# Patient Record
Sex: FEMALE | Race: BLACK OR AFRICAN AMERICAN | NOT HISPANIC OR LATINO | Employment: FULL TIME | URBAN - METROPOLITAN AREA
[De-identification: names, ages, dates, MRNs, and addresses within clinical notes are randomized per-mention and may not be internally consistent; named-entity substitution may affect disease eponyms.]

---

## 2023-03-09 DIAGNOSIS — R42 DIZZINESS: ICD-10-CM

## 2023-03-09 DIAGNOSIS — R42 DIZZINESS: Primary | ICD-10-CM

## 2023-03-09 PROBLEM — M25.562 KNEE PAIN, BILATERAL: Status: ACTIVE | Noted: 2023-03-09

## 2023-03-09 PROBLEM — I10 BENIGN ESSENTIAL HYPERTENSION: Status: ACTIVE | Noted: 2023-03-09

## 2023-03-09 PROBLEM — H52.10 MYOPIA WITH PRESBYOPIA: Status: ACTIVE | Noted: 2023-03-09

## 2023-03-09 PROBLEM — M17.0 PRIMARY OSTEOARTHRITIS OF BOTH KNEES: Status: ACTIVE | Noted: 2023-03-09

## 2023-03-09 PROBLEM — K21.9 GERD WITHOUT ESOPHAGITIS: Status: ACTIVE | Noted: 2023-03-09

## 2023-03-09 PROBLEM — R91.1 PULMONARY NODULE: Status: ACTIVE | Noted: 2023-03-09

## 2023-03-09 PROBLEM — I87.8 CHRONIC VENOUS STASIS: Status: ACTIVE | Noted: 2023-03-09

## 2023-03-09 PROBLEM — M17.12 ARTHRITIS OF LEFT KNEE: Status: ACTIVE | Noted: 2023-03-09

## 2023-03-09 PROBLEM — M48.062 LUMBAR STENOSIS WITH NEUROGENIC CLAUDICATION: Status: ACTIVE | Noted: 2023-03-09

## 2023-03-09 PROBLEM — E78.5 DYSLIPIDEMIA: Status: ACTIVE | Noted: 2023-03-09

## 2023-03-09 PROBLEM — H25.813 COMBINED FORM OF AGE-RELATED CATARACT, BOTH EYES: Status: ACTIVE | Noted: 2023-03-09

## 2023-03-09 PROBLEM — R60.0 LEG EDEMA: Status: ACTIVE | Noted: 2023-03-09

## 2023-03-09 PROBLEM — G47.00 INSOMNIA: Status: ACTIVE | Noted: 2023-03-09

## 2023-03-09 PROBLEM — H40.003 GLAUCOMA SUSPECT OF BOTH EYES: Status: ACTIVE | Noted: 2023-03-09

## 2023-03-09 PROBLEM — G47.30 SLEEP APNEA, UNSPECIFIED: Status: ACTIVE | Noted: 2023-03-09

## 2023-03-09 PROBLEM — E66.813 OBESITY, CLASS III, BMI 40-49.9 (MORBID OBESITY): Status: ACTIVE | Noted: 2023-03-09

## 2023-03-09 PROBLEM — M25.561 KNEE PAIN, BILATERAL: Status: ACTIVE | Noted: 2023-03-09

## 2023-03-09 PROBLEM — E11.9 DIABETES MELLITUS, TYPE 2 (MULTI): Status: ACTIVE | Noted: 2023-03-09

## 2023-03-09 PROBLEM — G47.33 OBSTRUCTIVE SLEEP APNEA, ADULT: Status: ACTIVE | Noted: 2023-03-09

## 2023-03-09 PROBLEM — H52.4 MYOPIA WITH PRESBYOPIA: Status: ACTIVE | Noted: 2023-03-09

## 2023-03-09 PROBLEM — E55.9 MILD VITAMIN D DEFICIENCY: Status: ACTIVE | Noted: 2023-03-09

## 2023-03-09 PROBLEM — M54.50 LOW BACK PAIN: Status: ACTIVE | Noted: 2023-03-09

## 2023-03-09 PROBLEM — E66.01 OBESITY, CLASS III, BMI 40-49.9 (MORBID OBESITY) (MULTI): Status: ACTIVE | Noted: 2023-03-09

## 2023-03-09 PROBLEM — M43.10 ACQUIRED SPONDYLOLISTHESIS: Status: ACTIVE | Noted: 2023-03-09

## 2023-03-09 RX ORDER — SIMVASTATIN 20 MG/1
1 TABLET, FILM COATED ORAL NIGHTLY
COMMUNITY
Start: 2022-03-31 | End: 2023-07-12 | Stop reason: ALTCHOICE

## 2023-03-09 RX ORDER — ACETAMINOPHEN 500 MG/1
CAPSULE, LIQUID FILLED ORAL
COMMUNITY

## 2023-03-09 RX ORDER — MECLIZINE HCL 12.5 MG 12.5 MG/1
1 TABLET ORAL 3 TIMES DAILY PRN
COMMUNITY
Start: 2021-04-29 | End: 2023-03-09 | Stop reason: SDUPTHER

## 2023-03-09 RX ORDER — TRIAMTERENE/HYDROCHLOROTHIAZID 37.5-25 MG
1 TABLET ORAL DAILY
COMMUNITY
Start: 2019-11-20 | End: 2023-05-01

## 2023-03-09 RX ORDER — MECLIZINE HCL 12.5 MG 12.5 MG/1
12.5 TABLET ORAL 3 TIMES DAILY PRN
Qty: 30 TABLET | Refills: 3 | Status: SHIPPED | OUTPATIENT
Start: 2023-03-09 | End: 2023-03-09 | Stop reason: SDUPTHER

## 2023-03-09 RX ORDER — MECLIZINE HCL 12.5 MG 12.5 MG/1
12.5 TABLET ORAL 3 TIMES DAILY PRN
Qty: 30 TABLET | Refills: 3 | Status: SHIPPED | OUTPATIENT
Start: 2023-03-09

## 2023-03-09 RX ORDER — FLUTICASONE PROPIONATE 50 MCG
2 SPRAY, SUSPENSION (ML) NASAL DAILY
COMMUNITY
Start: 2022-12-06

## 2023-03-16 RX ORDER — FLUTICASONE PROPIONATE 93 UG/1
SPRAY, METERED NASAL
COMMUNITY
Start: 2023-03-02 | End: 2023-07-12 | Stop reason: SDUPTHER

## 2023-03-16 RX ORDER — NEBULIZER AND COMPRESSOR
EACH MISCELLANEOUS
COMMUNITY
Start: 2023-01-20

## 2023-04-28 DIAGNOSIS — I10 BENIGN ESSENTIAL HYPERTENSION: Primary | ICD-10-CM

## 2023-05-01 RX ORDER — TRIAMTERENE/HYDROCHLOROTHIAZID 37.5-25 MG
TABLET ORAL
Qty: 90 TABLET | Refills: 3 | Status: SHIPPED | OUTPATIENT
Start: 2023-05-01 | End: 2023-09-19

## 2023-05-17 DIAGNOSIS — M17.12 ARTHRITIS OF LEFT KNEE: Primary | ICD-10-CM

## 2023-05-17 DIAGNOSIS — M48.062 LUMBAR STENOSIS WITH NEUROGENIC CLAUDICATION: ICD-10-CM

## 2023-05-17 RX ORDER — DICLOFENAC SODIUM 10 MG/G
4 GEL TOPICAL 4 TIMES DAILY
Qty: 100 G | Refills: 3 | Status: SHIPPED | OUTPATIENT
Start: 2023-05-17 | End: 2023-08-28

## 2023-05-18 DIAGNOSIS — M17.12 ARTHRITIS OF LEFT KNEE: Primary | ICD-10-CM

## 2023-05-18 RX ORDER — AMOXICILLIN AND CLAVULANATE POTASSIUM 875; 125 MG/1; MG/1
1 TABLET, FILM COATED ORAL EVERY 12 HOURS
COMMUNITY
Start: 2023-01-03 | End: 2023-07-12 | Stop reason: ALTCHOICE

## 2023-05-18 RX ORDER — FLUTICASONE PROPIONATE 93 UG/1
1 SPRAY, METERED NASAL 2 TIMES DAILY
COMMUNITY
Start: 2023-01-18 | End: 2024-02-27 | Stop reason: ALTCHOICE

## 2023-05-18 RX ORDER — ACETAMINOPHEN 500 MG
TABLET ORAL
Qty: 90 TABLET | Refills: 3 | Status: SHIPPED | OUTPATIENT
Start: 2023-05-18

## 2023-05-18 RX ORDER — SEMAGLUTIDE 1.34 MG/ML
INJECTION, SOLUTION SUBCUTANEOUS
COMMUNITY
Start: 2023-01-13 | End: 2023-07-12

## 2023-07-12 ENCOUNTER — TELEMEDICINE (OUTPATIENT)
Dept: PRIMARY CARE | Facility: CLINIC | Age: 63
End: 2023-07-12
Payer: COMMERCIAL

## 2023-07-12 DIAGNOSIS — M25.471 ANKLE EDEMA, BILATERAL: Primary | ICD-10-CM

## 2023-07-12 DIAGNOSIS — M25.472 ANKLE EDEMA, BILATERAL: Primary | ICD-10-CM

## 2023-07-12 DIAGNOSIS — I10 BENIGN ESSENTIAL HYPERTENSION: ICD-10-CM

## 2023-07-12 DIAGNOSIS — E11.9 TYPE 2 DIABETES MELLITUS WITHOUT COMPLICATION, WITHOUT LONG-TERM CURRENT USE OF INSULIN (MULTI): ICD-10-CM

## 2023-07-12 PROBLEM — Q66.6 CALCANEOVALGUS DEFORMITY OF BOTH FEET: Status: ACTIVE | Noted: 2017-05-12

## 2023-07-12 PROBLEM — M21.6X2 PRONATION OF BOTH FEET: Status: ACTIVE | Noted: 2023-07-12

## 2023-07-12 PROBLEM — E04.2 MULTIPLE THYROID NODULES: Status: ACTIVE | Noted: 2018-01-29

## 2023-07-12 PROBLEM — J34.2 NASAL SEPTAL DEVIATION: Status: ACTIVE | Noted: 2023-07-12

## 2023-07-12 PROBLEM — M21.6X1 PRONATION OF BOTH FEET: Status: ACTIVE | Noted: 2023-07-12

## 2023-07-12 PROBLEM — H04.123 DRY EYES, BILATERAL: Status: ACTIVE | Noted: 2023-07-12

## 2023-07-12 PROBLEM — M62.838 MUSCLE SPASM: Status: ACTIVE | Noted: 2023-07-12

## 2023-07-12 PROCEDURE — 99214 OFFICE O/P EST MOD 30 MIN: CPT | Performed by: PHYSICIAN ASSISTANT

## 2023-07-12 RX ORDER — LANCETS
EACH MISCELLANEOUS
Qty: 100 EACH | Refills: 3 | Status: SHIPPED | OUTPATIENT
Start: 2023-07-12

## 2023-07-12 RX ORDER — DEXTROSE 4 G
TABLET,CHEWABLE ORAL
Qty: 1 EACH | Refills: 0 | Status: SHIPPED | OUTPATIENT
Start: 2023-07-12 | End: 2024-07-11

## 2023-07-12 RX ORDER — ATORVASTATIN CALCIUM 40 MG/1
1 TABLET, FILM COATED ORAL NIGHTLY
COMMUNITY
Start: 2020-05-20

## 2023-07-12 RX ORDER — METFORMIN HYDROCHLORIDE 500 MG/1
500 TABLET, EXTENDED RELEASE ORAL
COMMUNITY
Start: 2019-11-20 | End: 2023-07-12 | Stop reason: SINTOL

## 2023-07-12 ASSESSMENT — ENCOUNTER SYMPTOMS
NUMBNESS: 0
MYALGIAS: 0
ARTHRALGIAS: 0
NAUSEA: 0
EYE DISCHARGE: 0
FACIAL SWELLING: 0
SORE THROAT: 0
JOINT SWELLING: 0
NERVOUS/ANXIOUS: 0
DIARRHEA: 0
ANAL BLEEDING: 0
POLYDIPSIA: 0
PALPITATIONS: 0
EYE PAIN: 0
FREQUENCY: 0
DIZZINESS: 0
FATIGUE: 0
TREMORS: 0
CONFUSION: 0
POLYPHAGIA: 0
COLOR CHANGE: 0
WHEEZING: 0
HEMATURIA: 0
CHEST TIGHTNESS: 0
CONSTIPATION: 0
ABDOMINAL PAIN: 0
CHILLS: 0
COUGH: 0
ABDOMINAL DISTENTION: 0
SLEEP DISTURBANCE: 0
FEVER: 0
HEADACHES: 0
APPETITE CHANGE: 0
DIFFICULTY URINATING: 0
SHORTNESS OF BREATH: 0
VOMITING: 0
WEAKNESS: 0
CHOKING: 0

## 2023-07-12 NOTE — PROGRESS NOTES
Subjective   Patient ID: Felecia Alejo is a 63 y.o. female with a history of hypertension, diabetes type 2, dyslipidemia, obesity, GERD, chronic low back pain, chronic pain of both knees, s/p hysterectomy who presents for Joint Swelling.    Virtual or Telephone Consent    An interactive audio and video telecommunication system which permits real time communications between the patient (at the originating site) and provider (at the distant site) was utilized to provide this telehealth service.   Verbal consent was requested and obtained from Felecia Alejo on this date, 07/12/23 for a telehealth visit.       HPI The patient is complaining of ankle edema which she has has been having for a few months.  Stated it gets worse as the day goes on and better with elevation and compression stockings.  Per patient, she drinks 72 oz of water a day and 3 cans of lemonade or soda.  Also she likes salty seasoning on her food.  She recently started walking more and does not seat as much as before.  She denies shortness of breath, chest pain or heart palpitations.  No wheezing.    Review of Systems   Constitutional:  Negative for appetite change, chills, fatigue and fever.   HENT:  Negative for congestion, ear pain, facial swelling, hearing loss, nosebleeds and sore throat.    Eyes:  Negative for pain, discharge and visual disturbance.   Respiratory:  Negative for cough, choking, chest tightness, shortness of breath and wheezing.    Cardiovascular:  Negative for chest pain, palpitations and leg swelling.        Ankle edema bilaterally   Gastrointestinal:  Negative for abdominal distention, abdominal pain, anal bleeding, constipation, diarrhea, nausea and vomiting.   Endocrine: Negative for cold intolerance, heat intolerance, polydipsia, polyphagia and polyuria.   Genitourinary:  Negative for difficulty urinating, frequency, hematuria and urgency.   Musculoskeletal:  Negative for arthralgias, gait problem, joint swelling and  myalgias.   Skin:  Negative for color change and rash.   Neurological:  Negative for dizziness, tremors, syncope, weakness, numbness and headaches.   Psychiatric/Behavioral:  Negative for behavioral problems, confusion, sleep disturbance and suicidal ideas. The patient is not nervous/anxious.        Objective   There were no vitals taken for this visit.    Physical Exam    Assessment/Plan     Ankle edema bilateral  The patient drinks approximately 110 ounces of liquid daily and eats salty food  She was instructed to cut down her fluids to no more than 64 ounces a day and cut down on salt  Substitute salty snacks with vegetables  Continue to wear compression stockings  Continue to elevate feet  Continue triamterene-HCTZ 37.5-25 mg daily  The patient was advised to come back to the office in a month for blood work including albumin and CBC  Referral for vascular surgery was given to the patient last visit, not done     High blood pressure   Blood pressure well controlled  No added salt diet, do not add salt to your food  Try to exercise every other day for 30 minutes  Continue triamterene-HCTZ 37.5-25 mg daily     Chronic pain of both knees  Knee replacement is recommended  not a good candidate since BMI is over 42  declined bariatric surgery  Had steroidal injection with much improvement  Able to exercise now  Do not take any NSAIDs such as Aleve, ibuprofen, Motrin or naproxen  Take Tylenol extra strength as needed for pain twice daily  Apply heating pads and topical cream  Follow-up with orthopedic surgery Dr. King      Low back pain  stable  Core exercises  Do not take NSAIDs  Tylenol as needed for pain  Apply heating pads  Apply topical cream such as Biofreeze or Voltaren     Muscle spasms  Improved  Off of magnesium     Diabetes type II  Ozempic was too expensive  The patient is currently not on any medications  She does not monitor her blood glucose at home  I will send a prescription for glucometer and I  instructed her to monitor blood glucose at least once a day before breakfast  Avoid soda and lemonade due to high sugar  Low carbohydrate diet, exercise daily  Follow up with ophthalmology as scheduled    Morbid obesity  Elevated BMI, ideal BMI is between 23-26 kg/m2  Weight loss recommended  Ozempic was too expensive  Low fat, low cholesterol diet, low carbohydrate diet  Exercise as much as you can     Health maintenance  Flu vaccine declined  COVID-vaccine up-to-date  Tdap 2018  Mammogram March 2022 negative  Colonoscopy, scheduled in 3 weeks     Come back in 1 month for fasting blood work and checkup

## 2023-09-14 RX ORDER — ALBUTEROL SULFATE 90 UG/1
AEROSOL, METERED RESPIRATORY (INHALATION)
COMMUNITY
Start: 2016-02-09

## 2023-09-14 RX ORDER — AMLODIPINE BESYLATE 5 MG/1
1 TABLET ORAL DAILY
COMMUNITY
Start: 2015-09-14 | End: 2023-09-19 | Stop reason: SINTOL

## 2023-09-14 RX ORDER — ERGOCALCIFEROL 1.25 MG/1
1 CAPSULE ORAL WEEKLY
COMMUNITY
Start: 2015-07-20

## 2023-09-14 RX ORDER — ATENOLOL 100 MG/1
1 TABLET ORAL DAILY
COMMUNITY
Start: 2015-09-10 | End: 2023-09-19 | Stop reason: ALTCHOICE

## 2023-09-14 RX ORDER — LANCETS 33 GAUGE
EACH MISCELLANEOUS
COMMUNITY
Start: 2023-07-12

## 2023-09-14 RX ORDER — OXAPROZIN 600 MG/1
TABLET, FILM COATED ORAL
COMMUNITY
Start: 2016-03-29 | End: 2024-02-27 | Stop reason: ALTCHOICE

## 2023-09-19 ENCOUNTER — OFFICE VISIT (OUTPATIENT)
Dept: PRIMARY CARE | Facility: CLINIC | Age: 63
End: 2023-09-19
Payer: COMMERCIAL

## 2023-09-19 VITALS
WEIGHT: 263 LBS | DIASTOLIC BLOOD PRESSURE: 82 MMHG | SYSTOLIC BLOOD PRESSURE: 126 MMHG | BODY MASS INDEX: 48.4 KG/M2 | TEMPERATURE: 96.8 F | HEIGHT: 62 IN

## 2023-09-19 DIAGNOSIS — R91.8 PULMONARY NODULES: ICD-10-CM

## 2023-09-19 DIAGNOSIS — R60.0 BILATERAL LEG EDEMA: Primary | ICD-10-CM

## 2023-09-19 DIAGNOSIS — E66.01 OBESITY, CLASS III, BMI 40-49.9 (MORBID OBESITY) (MULTI): ICD-10-CM

## 2023-09-19 DIAGNOSIS — Z12.31 ENCOUNTER FOR SCREENING MAMMOGRAM FOR MALIGNANT NEOPLASM OF BREAST: ICD-10-CM

## 2023-09-19 DIAGNOSIS — Z78.0 ASYMPTOMATIC MENOPAUSAL STATE: ICD-10-CM

## 2023-09-19 DIAGNOSIS — E53.8 VITAMIN B12 DEFICIENCY: ICD-10-CM

## 2023-09-19 DIAGNOSIS — E11.9 TYPE 2 DIABETES MELLITUS WITHOUT COMPLICATION, WITHOUT LONG-TERM CURRENT USE OF INSULIN (MULTI): ICD-10-CM

## 2023-09-19 DIAGNOSIS — E55.9 MILD VITAMIN D DEFICIENCY: ICD-10-CM

## 2023-09-19 DIAGNOSIS — R01.1 MURMUR, CARDIAC: ICD-10-CM

## 2023-09-19 DIAGNOSIS — E78.2 MIXED HYPERLIPIDEMIA: ICD-10-CM

## 2023-09-19 DIAGNOSIS — I10 BENIGN ESSENTIAL HYPERTENSION: ICD-10-CM

## 2023-09-19 DIAGNOSIS — E78.5 DYSLIPIDEMIA: ICD-10-CM

## 2023-09-19 LAB
APPEARANCE UR: CLEAR
BILIRUB UR QL STRIP: NEGATIVE
COLOR UR: YELLOW
GLUCOSE UR STRIP-MCNC: NEGATIVE MG/DL
HGB UR QL STRIP: NEGATIVE
KETONES UR STRIP-MCNC: NEGATIVE MG/DL
LEUKOCYTE ESTERASE UR QL STRIP: NEGATIVE
NITRITE UR QL STRIP: NEGATIVE
PH UR STRIP: 7.5 [PH]
PROT UR STRIP-MCNC: NEGATIVE MG/DL
SP GR UR STRIP.AUTO: 1.02
UROBILINOGEN UR STRIP-ACNC: 0.2 E.U./DL

## 2023-09-19 PROCEDURE — 83036 HEMOGLOBIN GLYCOSYLATED A1C: CPT | Performed by: PHYSICIAN ASSISTANT

## 2023-09-19 PROCEDURE — 3074F SYST BP LT 130 MM HG: CPT | Performed by: PHYSICIAN ASSISTANT

## 2023-09-19 PROCEDURE — 3079F DIAST BP 80-89 MM HG: CPT | Performed by: PHYSICIAN ASSISTANT

## 2023-09-19 PROCEDURE — 1036F TOBACCO NON-USER: CPT | Performed by: PHYSICIAN ASSISTANT

## 2023-09-19 PROCEDURE — 81003 URINALYSIS AUTO W/O SCOPE: CPT | Performed by: PHYSICIAN ASSISTANT

## 2023-09-19 PROCEDURE — 85025 COMPLETE CBC W/AUTO DIFF WBC: CPT | Performed by: PHYSICIAN ASSISTANT

## 2023-09-19 PROCEDURE — 82306 VITAMIN D 25 HYDROXY: CPT | Performed by: PHYSICIAN ASSISTANT

## 2023-09-19 PROCEDURE — 84443 ASSAY THYROID STIM HORMONE: CPT | Performed by: PHYSICIAN ASSISTANT

## 2023-09-19 PROCEDURE — 82607 VITAMIN B-12: CPT | Performed by: PHYSICIAN ASSISTANT

## 2023-09-19 PROCEDURE — 99214 OFFICE O/P EST MOD 30 MIN: CPT | Performed by: PHYSICIAN ASSISTANT

## 2023-09-19 PROCEDURE — 80053 COMPREHEN METABOLIC PANEL: CPT | Performed by: PHYSICIAN ASSISTANT

## 2023-09-19 RX ORDER — FUROSEMIDE 40 MG/1
40 TABLET ORAL DAILY
Qty: 30 TABLET | Refills: 3 | Status: SHIPPED | OUTPATIENT
Start: 2023-09-19 | End: 2023-12-26

## 2023-09-19 RX ORDER — TOPIRAMATE 25 MG/1
25 TABLET ORAL DAILY
Qty: 30 TABLET | Refills: 5 | Status: SHIPPED | OUTPATIENT
Start: 2023-09-19 | End: 2023-12-26

## 2023-09-19 ASSESSMENT — PATIENT HEALTH QUESTIONNAIRE - PHQ9
10. IF YOU CHECKED OFF ANY PROBLEMS, HOW DIFFICULT HAVE THESE PROBLEMS MADE IT FOR YOU TO DO YOUR WORK, TAKE CARE OF THINGS AT HOME, OR GET ALONG WITH OTHER PEOPLE: NOT DIFFICULT AT ALL
2. FEELING DOWN, DEPRESSED OR HOPELESS: NEARLY EVERY DAY
7. TROUBLE CONCENTRATING ON THINGS, SUCH AS READING THE NEWSPAPER OR WATCHING TELEVISION: NOT AT ALL
SUM OF ALL RESPONSES TO PHQ9 QUESTIONS 1 AND 2: 6
SUM OF ALL RESPONSES TO PHQ QUESTIONS 1-9: 11
6. FEELING BAD ABOUT YOURSELF - OR THAT YOU ARE A FAILURE OR HAVE LET YOURSELF OR YOUR FAMILY DOWN: NOT AT ALL
9. THOUGHTS THAT YOU WOULD BE BETTER OFF DEAD, OR OF HURTING YOURSELF: NOT AT ALL
8. MOVING OR SPEAKING SO SLOWLY THAT OTHER PEOPLE COULD HAVE NOTICED. OR THE OPPOSITE, BEING SO FIGETY OR RESTLESS THAT YOU HAVE BEEN MOVING AROUND A LOT MORE THAN USUAL: NOT AT ALL
3. TROUBLE FALLING OR STAYING ASLEEP OR SLEEPING TOO MUCH: SEVERAL DAYS
5. POOR APPETITE OR OVEREATING: NEARLY EVERY DAY
1. LITTLE INTEREST OR PLEASURE IN DOING THINGS: NEARLY EVERY DAY
4. FEELING TIRED OR HAVING LITTLE ENERGY: SEVERAL DAYS

## 2023-09-19 ASSESSMENT — ENCOUNTER SYMPTOMS
SLEEP DISTURBANCE: 0
HEMATURIA: 0
POLYDIPSIA: 0
TREMORS: 0
VOMITING: 0
POLYPHAGIA: 0
FATIGUE: 0
ABDOMINAL PAIN: 0
CONFUSION: 0
SORE THROAT: 0
EYE PAIN: 0
HEADACHES: 0
DIZZINESS: 0
CHEST TIGHTNESS: 0
WHEEZING: 0
SHORTNESS OF BREATH: 0
NERVOUS/ANXIOUS: 0
FREQUENCY: 0
CHILLS: 0
CONSTIPATION: 0
JOINT SWELLING: 0
COUGH: 0
ANAL BLEEDING: 0
LOSS OF SENSATION IN FEET: 0
CHOKING: 0
EYE DISCHARGE: 0
OCCASIONAL FEELINGS OF UNSTEADINESS: 1
MYALGIAS: 0
DEPRESSION: 1
DIARRHEA: 0
WEAKNESS: 0
APPETITE CHANGE: 0
NAUSEA: 0
PALPITATIONS: 0
DIFFICULTY URINATING: 0
FACIAL SWELLING: 0
ARTHRALGIAS: 0
NUMBNESS: 0
FEVER: 0
ABDOMINAL DISTENTION: 0
COLOR CHANGE: 0

## 2023-09-19 ASSESSMENT — COLUMBIA-SUICIDE SEVERITY RATING SCALE - C-SSRS
1. IN THE PAST MONTH, HAVE YOU WISHED YOU WERE DEAD OR WISHED YOU COULD GO TO SLEEP AND NOT WAKE UP?: NO
2. HAVE YOU ACTUALLY HAD ANY THOUGHTS OF KILLING YOURSELF?: NO
6. HAVE YOU EVER DONE ANYTHING, STARTED TO DO ANYTHING, OR PREPARED TO DO ANYTHING TO END YOUR LIFE?: NO

## 2023-09-19 NOTE — PROGRESS NOTES
Subjective   Patient ID: Felecia Alejo is a 63 y.o. female with a history of hypertension, leg edema, diabetes type 2, dyslipidemia, obesity, GERD, chronic low back pain, chronic pain of both knees, s/p hysterectomy who presents for Follow-up and Leg Swelling.    HPI The patient's leg edema is not improving.  She tries to elevate her feet and takes triamterene-HCTZ.  She cut down on fluids and salt; however, is not getting any better.  Her blood pressure runs below 110 / 70 mmHg and the pulse is in between 80-90/min.  She denies shortness of breath or chest pain. She gained 5 pounds since January.  Stated she was exercising but stopped due to knee pain.    Her blood glucose is in between  mgdL.   She feels sad and down this month since she has birthdays of her  siblings and father and she misses them a lot.  Stated she is the only one left and feels alone.  Denies suicidal or homicidal ideations.    Review of Systems   Constitutional:  Negative for appetite change, chills, fatigue and fever.   HENT:  Negative for congestion, ear pain, facial swelling, hearing loss, nosebleeds and sore throat.    Eyes:  Negative for pain, discharge and visual disturbance.   Respiratory:  Negative for cough, choking, chest tightness, shortness of breath and wheezing.    Cardiovascular:  Positive for leg swelling. Negative for chest pain and palpitations.   Gastrointestinal:  Negative for abdominal distention, abdominal pain, anal bleeding, constipation, diarrhea, nausea and vomiting.   Endocrine: Negative for cold intolerance, heat intolerance, polydipsia, polyphagia and polyuria.   Genitourinary:  Negative for difficulty urinating, frequency, hematuria and urgency.   Musculoskeletal:  Negative for arthralgias, gait problem, joint swelling and myalgias.   Skin:  Negative for color change and rash.   Neurological:  Negative for dizziness, tremors, syncope, weakness, numbness and headaches.   Psychiatric/Behavioral:   "Negative for behavioral problems, confusion, sleep disturbance and suicidal ideas. The patient is not nervous/anxious.         Depressed       Objective   /82   Temp 36 °C (96.8 °F)   Ht 1.575 m (5' 2\")   Wt 119 kg (263 lb)   BMI 48.10 kg/m²     Physical Exam  Constitutional:       General: She is not in acute distress.     Appearance: Normal appearance.   HENT:      Head: Normocephalic and atraumatic.      Nose: Nose normal.   Eyes:      Extraocular Movements: Extraocular movements intact.      Conjunctiva/sclera: Conjunctivae normal.      Pupils: Pupils are equal, round, and reactive to light.   Cardiovascular:      Rate and Rhythm: Normal rate and regular rhythm.      Pulses: Normal pulses.      Heart sounds: Normal heart sounds.   Pulmonary:      Effort: Pulmonary effort is normal.      Breath sounds: Normal breath sounds.   Abdominal:      General: Bowel sounds are normal.      Palpations: Abdomen is soft.   Musculoskeletal:         General: Normal range of motion.      Cervical back: Normal range of motion and neck supple.      Right lower leg: Edema present.      Left lower leg: Edema present.   Neurological:      General: No focal deficit present.      Mental Status: She is alert and oriented to person, place, and time.   Psychiatric:         Mood and Affect: Mood normal.         Behavior: Behavior normal.         Thought Content: Thought content normal.         Judgment: Judgment normal.      Comments: Tearful         Assessment/Plan   leg edema bilaterally  The patient cut down on liquid and salty food  No improvement  Stop triamterene-HCTZ  Start furosemide 40 mg daily  Continue to wear compression stockings  Continue to elevate feet     High blood pressure   Stop triamterene-HCTZ  Start furosemide 40 mg daily  Monitor blood pressure daily  Call me next week with home readings  If elevated, we will add metoprolol  No added salt diet, do not add salt to your food  Try to exercise every other day " for 30 minutes     Diabetes type II  Well controlled since on a diet  Continue low carbohydrate diet, exercise daily  Follow up with ophthalmology as scheduled    Pulmonary nodule  Two nodules 6.7 mm and 4.5 mm in left lower lobe, accidental finding on abdominal CT scan  CT chest, requisition is given    Depression  Not interested in medications for now  Patient was instructed to come back to the office if her depression gets worse    Chronic pain of both knees  Knee replacement is recommended  not a good candidate since BMI is over 42  declined bariatric surgery  Had steroidal injection with much improvement  Do not take any NSAIDs such as Aleve, ibuprofen, Motrin or naproxen  Take Tylenol extra strength as needed for pain twice daily  Apply heating pads and topical cream  Follow-up with orthopedic surgery Dr. King      Low back pain  stable  Core exercises  Do not take NSAIDs  Tylenol as needed for pain  Apply heating pads  Apply topical cream such as Biofreeze or Voltaren     Muscle spasms  Improved  Off of magnesium    Morbid obesity  BMI 48.09 kg/m2  Elevated BMI, ideal BMI is between 23-26 kg/m2  Weight loss recommended  Ozempic was too expensive  Start topiramate 25 mg  Take 1 tablet for 1 week then 2 tablets for 2 weeks then 3 tablets thereafter  Low fat, low cholesterol diet, low carbohydrate diet  Exercise as much as you can     Health maintenance  Flu vaccine declined  COVID-vaccine up-to-date  Tdap 2018  Mammogram March 2022 negative, requisition is given  DEXA bone density, requisition was given  Colonoscopy, scheduled in 3 weeks

## 2023-09-20 DIAGNOSIS — E11.9 TYPE 2 DIABETES MELLITUS WITHOUT COMPLICATION, WITHOUT LONG-TERM CURRENT USE OF INSULIN (MULTI): Primary | ICD-10-CM

## 2023-09-20 DIAGNOSIS — N18.31 STAGE 3A CHRONIC KIDNEY DISEASE (MULTI): ICD-10-CM

## 2023-09-26 RX ORDER — DAPAGLIFLOZIN 10 MG/1
10 TABLET, FILM COATED ORAL DAILY
Qty: 30 TABLET | Refills: 11 | Status: SHIPPED | OUTPATIENT
Start: 2023-09-26 | End: 2024-02-27

## 2023-10-02 ENCOUNTER — ANCILLARY PROCEDURE (OUTPATIENT)
Dept: RADIOLOGY | Facility: CLINIC | Age: 63
End: 2023-10-02
Payer: COMMERCIAL

## 2023-10-02 DIAGNOSIS — R91.8 PULMONARY NODULES: ICD-10-CM

## 2023-10-02 PROCEDURE — 71250 CT THORAX DX C-: CPT

## 2023-10-02 PROCEDURE — 71250 CT THORAX DX C-: CPT | Performed by: RADIOLOGY

## 2023-10-03 ENCOUNTER — ANCILLARY PROCEDURE (OUTPATIENT)
Dept: RADIOLOGY | Facility: CLINIC | Age: 63
End: 2023-10-03
Payer: COMMERCIAL

## 2023-10-03 VITALS — WEIGHT: 262.35 LBS | BODY MASS INDEX: 48.28 KG/M2 | HEIGHT: 62 IN

## 2023-10-03 DIAGNOSIS — Z12.31 ENCOUNTER FOR SCREENING MAMMOGRAM FOR MALIGNANT NEOPLASM OF BREAST: ICD-10-CM

## 2023-10-03 DIAGNOSIS — N63.20 MASS OF LEFT BREAST, UNSPECIFIED QUADRANT: Primary | ICD-10-CM

## 2023-10-03 PROCEDURE — 77067 SCR MAMMO BI INCL CAD: CPT | Mod: BILATERAL PROCEDURE | Performed by: RADIOLOGY

## 2023-10-03 PROCEDURE — 77063 BREAST TOMOSYNTHESIS BI: CPT | Mod: 50

## 2023-10-03 PROCEDURE — 77063 BREAST TOMOSYNTHESIS BI: CPT | Mod: BILATERAL PROCEDURE | Performed by: RADIOLOGY

## 2023-10-05 ENCOUNTER — ANCILLARY PROCEDURE (OUTPATIENT)
Dept: RADIOLOGY | Facility: CLINIC | Age: 63
End: 2023-10-05
Payer: COMMERCIAL

## 2023-10-05 DIAGNOSIS — N63.20 MASS OF LEFT BREAST, UNSPECIFIED QUADRANT: ICD-10-CM

## 2023-10-05 PROCEDURE — 76642 ULTRASOUND BREAST LIMITED: CPT | Mod: LT

## 2023-10-05 PROCEDURE — 76642 ULTRASOUND BREAST LIMITED: CPT | Mod: LEFT SIDE | Performed by: STUDENT IN AN ORGANIZED HEALTH CARE EDUCATION/TRAINING PROGRAM

## 2023-10-07 PROBLEM — R06.83 SNORING: Status: ACTIVE | Noted: 2023-10-07

## 2023-10-07 PROBLEM — M89.8X1 PERISCAPULAR PAIN: Status: ACTIVE | Noted: 2023-10-07

## 2023-10-07 PROBLEM — R29.898 WEAKNESS OF BOTH LOWER EXTREMITIES: Status: ACTIVE | Noted: 2023-10-07

## 2023-10-07 PROBLEM — E63.9 INADEQUATE DIETARY INTAKE: Status: ACTIVE | Noted: 2023-10-07

## 2023-10-07 PROBLEM — I89.0 LYMPHEDEMA: Status: ACTIVE | Noted: 2023-10-07

## 2023-10-07 PROBLEM — B07.0 PLANTAR WART OF LEFT FOOT: Status: ACTIVE | Noted: 2023-10-07

## 2023-10-07 PROBLEM — M76.821 TIBIALIS POSTERIOR TENDINITIS, RIGHT: Status: ACTIVE | Noted: 2023-10-07

## 2023-10-07 PROBLEM — L60.1 ONYCHOLYSIS OF TOENAIL: Status: ACTIVE | Noted: 2023-10-07

## 2023-10-07 PROBLEM — J34.89 NASAL OBSTRUCTION: Status: ACTIVE | Noted: 2023-10-07

## 2023-10-07 PROBLEM — R63.2 POLYPHAGIA: Status: ACTIVE | Noted: 2023-10-07

## 2023-10-07 PROBLEM — D72.9 ABNORMAL WHITE BLOOD CELL: Status: ACTIVE | Noted: 2023-10-07

## 2023-10-07 PROBLEM — R26.9 ABNORMAL GAIT: Status: ACTIVE | Noted: 2023-10-07

## 2023-10-07 PROBLEM — J34.89 MASS OF NASAL SINUS: Status: ACTIVE | Noted: 2023-10-07

## 2023-10-07 PROBLEM — J33.1 POLYPOID SINUS DEGENERATION: Status: ACTIVE | Noted: 2023-10-07

## 2023-10-07 RX ORDER — SEMAGLUTIDE 1.34 MG/ML
0.25 INJECTION, SOLUTION SUBCUTANEOUS
COMMUNITY
End: 2024-02-27

## 2023-10-07 RX ORDER — AMOXICILLIN AND CLAVULANATE POTASSIUM 875; 125 MG/1; MG/1
1 TABLET, FILM COATED ORAL EVERY 12 HOURS
COMMUNITY
End: 2024-02-27 | Stop reason: ALTCHOICE

## 2023-10-07 RX ORDER — TRIAMTERENE/HYDROCHLOROTHIAZID 37.5-25 MG
1 TABLET ORAL DAILY
COMMUNITY
End: 2024-02-27 | Stop reason: SDUPTHER

## 2023-10-09 ENCOUNTER — HOSPITAL ENCOUNTER (OUTPATIENT)
Dept: CARDIOLOGY | Facility: CLINIC | Age: 63
Discharge: HOME | End: 2023-10-09
Payer: COMMERCIAL

## 2023-10-09 DIAGNOSIS — R01.1 MURMUR, CARDIAC: ICD-10-CM

## 2023-10-09 PROCEDURE — 93306 TTE W/DOPPLER COMPLETE: CPT | Performed by: INTERNAL MEDICINE

## 2023-10-09 PROCEDURE — 93306 TTE W/DOPPLER COMPLETE: CPT

## 2023-10-10 ENCOUNTER — TELEPHONE (OUTPATIENT)
Dept: PRIMARY CARE | Facility: CLINIC | Age: 63
End: 2023-10-10
Payer: COMMERCIAL

## 2023-10-10 ENCOUNTER — ANCILLARY PROCEDURE (OUTPATIENT)
Dept: RADIOLOGY | Facility: CLINIC | Age: 63
End: 2023-10-10
Payer: COMMERCIAL

## 2023-10-10 DIAGNOSIS — Z78.0 ASYMPTOMATIC MENOPAUSAL STATE: ICD-10-CM

## 2023-10-10 LAB — EJECTION FRACTION APICAL 4 CHAMBER: 55.9

## 2023-10-10 PROCEDURE — 77080 DXA BONE DENSITY AXIAL: CPT

## 2023-10-10 PROCEDURE — 77080 DXA BONE DENSITY AXIAL: CPT | Performed by: RADIOLOGY

## 2023-10-11 ENCOUNTER — TELEPHONE (OUTPATIENT)
Dept: PRIMARY CARE | Facility: CLINIC | Age: 63
End: 2023-10-11
Payer: COMMERCIAL

## 2023-10-24 ENCOUNTER — TELEPHONE (OUTPATIENT)
Dept: PRIMARY CARE | Facility: CLINIC | Age: 63
End: 2023-10-24

## 2023-10-26 DIAGNOSIS — M17.10 PRIMARY OSTEOARTHRITIS OF KNEE, UNSPECIFIED LATERALITY: Primary | ICD-10-CM

## 2023-11-03 ENCOUNTER — OFFICE VISIT (OUTPATIENT)
Dept: ORTHOPEDIC SURGERY | Facility: CLINIC | Age: 63
End: 2023-11-03
Payer: COMMERCIAL

## 2023-11-03 DIAGNOSIS — M17.12 PRIMARY OSTEOARTHRITIS OF LEFT KNEE: Primary | ICD-10-CM

## 2023-11-03 DIAGNOSIS — M17.10 PRIMARY OSTEOARTHRITIS OF KNEE, UNSPECIFIED LATERALITY: ICD-10-CM

## 2023-11-03 PROCEDURE — 2500000004 HC RX 250 GENERAL PHARMACY W/ HCPCS (ALT 636 FOR OP/ED): Performed by: ORTHOPAEDIC SURGERY

## 2023-11-03 PROCEDURE — 20610 DRAIN/INJ JOINT/BURSA W/O US: CPT | Mod: LT | Performed by: ORTHOPAEDIC SURGERY

## 2023-11-03 PROCEDURE — 2500000005 HC RX 250 GENERAL PHARMACY W/O HCPCS: Performed by: ORTHOPAEDIC SURGERY

## 2023-11-03 PROCEDURE — 1036F TOBACCO NON-USER: CPT | Performed by: ORTHOPAEDIC SURGERY

## 2023-12-03 PROCEDURE — 2500000004 HC RX 250 GENERAL PHARMACY W/ HCPCS (ALT 636 FOR OP/ED): Performed by: ORTHOPAEDIC SURGERY

## 2023-12-03 PROCEDURE — 2500000005 HC RX 250 GENERAL PHARMACY W/O HCPCS: Performed by: ORTHOPAEDIC SURGERY

## 2023-12-03 PROCEDURE — 20610 DRAIN/INJ JOINT/BURSA W/O US: CPT | Performed by: ORTHOPAEDIC SURGERY

## 2023-12-03 RX ORDER — LIDOCAINE HYDROCHLORIDE 10 MG/ML
4 INJECTION INFILTRATION; PERINEURAL
Status: COMPLETED | OUTPATIENT
Start: 2023-12-03 | End: 2023-12-03

## 2023-12-03 RX ORDER — TRIAMCINOLONE ACETONIDE 40 MG/ML
1 INJECTION, SUSPENSION INTRA-ARTICULAR; INTRAMUSCULAR
Status: COMPLETED | OUTPATIENT
Start: 2023-12-03 | End: 2023-12-03

## 2023-12-03 RX ADMIN — LIDOCAINE HYDROCHLORIDE 4 ML: 10 INJECTION, SOLUTION INFILTRATION; PERINEURAL at 10:59

## 2023-12-03 RX ADMIN — TRIAMCINOLONE ACETONIDE 1 ML: 40 INJECTION, SUSPENSION INTRA-ARTICULAR; INTRAMUSCULAR at 10:59

## 2023-12-03 NOTE — PROGRESS NOTES
L Inj/Asp: L knee on 12/3/2023 10:59 AM  Indications: pain and joint swelling  Details: 22 G needle, anterolateral approach  Medications: 4 mL lidocaine 10 mg/mL (1 %); 1 mL triamcinolone acetonide 40 mg/mL    Discussion:  I discussed the conservative treatment options for knee osteoarthritis including but not limited to physical therapy, oral NSAIDS, activity and lifestyle modification, and corticosteroid injections. Pt has elected to undergo a cortisone injection today. I have explained the risk and benefits of an injection including the possibility of joint infection, bleeding, damage to cartilage, allergic reaction. Patient verbalized understanding and gave verbal consent wishes to proceed with a intra-articular cortisone injection for their knee.    Procedure:  After discussing the risk and benefits of the procedure, we proceeded with an intra-articular left knee injection.    With the patient's informed verbal consent, the left knee was prepped in standard sterile fashion with Chlorhexidine. The skin was then anesthetized with ethyl chloride spray and cleaned again with Chlorhexidine. The knee was then apirated/injected with a prefilled 20-gauge syringe of 40 mg Kenalog + 4 ml Lidocaine using the lateral approach without complications.  The patient tolerated this well and felt immediate initial relief of symptoms. A bandaid was applied and the patient ambulated out of the clinic on ther own accord without difficulty. Patient was instructed to avoid physical activity for 24-48 hours to prevent the knees from swelling and may ice the knees as tolerated. Patient should contact the office if any signs of of infection appear: redness, fever, chills, drainage, swelling or warmth to the knees.  Pt understands that the injections can be repeated no sooner than 3 months.  Procedure, treatment alternatives, risks and benefits explained, specific risks discussed. Consent was given by the patient. Immediately prior to  procedure a time out was called to verify the correct patient, procedure, equipment, support staff and site/side marked as required. Patient was prepped and draped in the usual sterile fashion.

## 2023-12-26 DIAGNOSIS — E66.01 OBESITY, CLASS III, BMI 40-49.9 (MORBID OBESITY) (MULTI): ICD-10-CM

## 2023-12-26 DIAGNOSIS — R60.0 BILATERAL LEG EDEMA: ICD-10-CM

## 2023-12-26 RX ORDER — TOPIRAMATE 25 MG/1
25 TABLET ORAL DAILY
Qty: 90 TABLET | Refills: 1 | Status: SHIPPED | OUTPATIENT
Start: 2023-12-26 | End: 2024-02-27 | Stop reason: SDUPTHER

## 2023-12-26 RX ORDER — FUROSEMIDE 40 MG/1
40 TABLET ORAL DAILY
Qty: 90 TABLET | Refills: 1 | Status: SHIPPED | OUTPATIENT
Start: 2023-12-26

## 2024-02-08 ENCOUNTER — OFFICE VISIT (OUTPATIENT)
Dept: ORTHOPEDIC SURGERY | Facility: CLINIC | Age: 64
End: 2024-02-08
Payer: MEDICARE

## 2024-02-08 DIAGNOSIS — M17.0 PRIMARY OSTEOARTHRITIS OF BOTH KNEES: Primary | ICD-10-CM

## 2024-02-08 PROCEDURE — 20610 DRAIN/INJ JOINT/BURSA W/O US: CPT | Mod: 50 | Performed by: ORTHOPAEDIC SURGERY

## 2024-02-08 PROCEDURE — 20610 DRAIN/INJ JOINT/BURSA W/O US: CPT | Performed by: ORTHOPAEDIC SURGERY

## 2024-02-08 PROCEDURE — 2500000005 HC RX 250 GENERAL PHARMACY W/O HCPCS: Performed by: ORTHOPAEDIC SURGERY

## 2024-02-08 PROCEDURE — 2500000004 HC RX 250 GENERAL PHARMACY W/ HCPCS (ALT 636 FOR OP/ED): Performed by: ORTHOPAEDIC SURGERY

## 2024-02-08 PROCEDURE — 1159F MED LIST DOCD IN RCRD: CPT | Performed by: ORTHOPAEDIC SURGERY

## 2024-02-08 PROCEDURE — 1036F TOBACCO NON-USER: CPT | Performed by: ORTHOPAEDIC SURGERY

## 2024-02-08 PROCEDURE — 1125F AMNT PAIN NOTED PAIN PRSNT: CPT | Performed by: ORTHOPAEDIC SURGERY

## 2024-02-08 RX ORDER — LIDOCAINE HYDROCHLORIDE 10 MG/ML
4 INJECTION INFILTRATION; PERINEURAL
Status: COMPLETED | OUTPATIENT
Start: 2024-02-08 | End: 2024-02-08

## 2024-02-08 RX ORDER — TRIAMCINOLONE ACETONIDE 40 MG/ML
1 INJECTION, SUSPENSION INTRA-ARTICULAR; INTRAMUSCULAR
Status: COMPLETED | OUTPATIENT
Start: 2024-02-08 | End: 2024-02-08

## 2024-02-08 RX ADMIN — LIDOCAINE HYDROCHLORIDE 4 ML: 10 INJECTION, SOLUTION INFILTRATION; PERINEURAL at 11:05

## 2024-02-08 RX ADMIN — TRIAMCINOLONE ACETONIDE 1 ML: 40 INJECTION, SUSPENSION INTRA-ARTICULAR; INTRAMUSCULAR at 11:05

## 2024-02-08 ASSESSMENT — PAIN SCALES - GENERAL: PAINLEVEL_OUTOF10: 0 - NO PAIN

## 2024-02-08 ASSESSMENT — PAIN - FUNCTIONAL ASSESSMENT: PAIN_FUNCTIONAL_ASSESSMENT: 0-10

## 2024-02-08 NOTE — PROGRESS NOTES
L Inj/Asp: bilateral knee on 2/8/2024 11:05 AM  Indications: pain and joint swelling  Details: 22 G needle, anterolateral approach  Medications (Right): 1 mL triamcinolone acetonide 40 mg/mL; 4 mL lidocaine 10 mg/mL (1 %)  Medications (Left): 1 mL triamcinolone acetonide 40 mg/mL; 4 mL lidocaine 10 mg/mL (1 %)    Discussion:  I discussed the conservative treatment options for knee osteoarthritis including but not limited to physical therapy, oral NSAIDS, activity and lifestyle modification, and corticosteroid injections. Pt has elected to undergo a cortisone injection today. I have explained the risk and benefits of an injection including the possibility of joint infection, bleeding, damage to cartilage, allergic reaction. Patient verbalized understanding and gave verbal consent wishes to proceed with a intra-articular cortisone injection for their knee.    Procedure:  After discussing the risk and benefits of the procedure, we proceeded with an intra-articular bilateral knee injection.    With the patient's informed verbal consent, the bilateral knees were prepped in standard sterile fashion with Chlorhexidine. The skin was then anesthetized with ethyl chloride spray and cleaned again with Chlorhexidine. The bilateral knees were then apirated/injected with a prefilled 20-gauge syringe of 40 mg Kenalog + 4 ml Lidocaine using the lateral approach without complications.  The patient tolerated this well and felt immediate initial relief of symptoms. A bandaid was applied and the patient ambulated out of the clinic on ther own accord without difficulty. Patient was instructed to avoid physical activity for 24-48 hours to prevent the knees from swelling and may ice the knees as tolerated. Patient should contact the office if any signs of of infection appear: redness, fever, chills, drainage, swelling or warmth to the knees.  Pt understands that the injections can be repeated no sooner than 3 months.      Procedure,  treatment alternatives, risks and benefits explained, specific risks discussed. Consent was given by the patient. Immediately prior to procedure a time out was called to verify the correct patient, procedure, equipment, support staff and site/side marked as required. Patient was prepped and draped in the usual sterile fashion.

## 2024-02-26 DIAGNOSIS — E78.00 PURE HYPERCHOLESTEROLEMIA: Primary | ICD-10-CM

## 2024-02-27 ENCOUNTER — OFFICE VISIT (OUTPATIENT)
Dept: PRIMARY CARE | Facility: CLINIC | Age: 64
End: 2024-02-27
Payer: MEDICARE

## 2024-02-27 VITALS
WEIGHT: 260 LBS | DIASTOLIC BLOOD PRESSURE: 90 MMHG | TEMPERATURE: 96.8 F | BODY MASS INDEX: 47.84 KG/M2 | SYSTOLIC BLOOD PRESSURE: 138 MMHG | HEIGHT: 62 IN

## 2024-02-27 DIAGNOSIS — E66.01 CLASS 3 SEVERE OBESITY DUE TO EXCESS CALORIES WITHOUT SERIOUS COMORBIDITY WITH BODY MASS INDEX (BMI) OF 45.0 TO 49.9 IN ADULT (MULTI): ICD-10-CM

## 2024-02-27 DIAGNOSIS — R60.0 BILATERAL LEG EDEMA: ICD-10-CM

## 2024-02-27 DIAGNOSIS — E78.00 PURE HYPERCHOLESTEROLEMIA: ICD-10-CM

## 2024-02-27 DIAGNOSIS — E55.9 MILD VITAMIN D DEFICIENCY: ICD-10-CM

## 2024-02-27 DIAGNOSIS — I10 BENIGN ESSENTIAL HYPERTENSION: ICD-10-CM

## 2024-02-27 DIAGNOSIS — E11.9 TYPE 2 DIABETES MELLITUS WITHOUT COMPLICATION, WITHOUT LONG-TERM CURRENT USE OF INSULIN (MULTI): ICD-10-CM

## 2024-02-27 DIAGNOSIS — R73.03 PREDIABETES: ICD-10-CM

## 2024-02-27 DIAGNOSIS — F33.9 DEPRESSION, RECURRENT (CMS-HCC): ICD-10-CM

## 2024-02-27 DIAGNOSIS — I87.2 VENOUS INSUFFICIENCY OF BOTH LOWER EXTREMITIES: ICD-10-CM

## 2024-02-27 DIAGNOSIS — Z00.00 ROUTINE GENERAL MEDICAL EXAMINATION AT HEALTH CARE FACILITY: Primary | ICD-10-CM

## 2024-02-27 DIAGNOSIS — E66.01 OBESITY, CLASS III, BMI 40-49.9 (MORBID OBESITY) (MULTI): ICD-10-CM

## 2024-02-27 PROBLEM — B07.0 PLANTAR WART OF LEFT FOOT: Status: RESOLVED | Noted: 2023-10-07 | Resolved: 2024-02-27

## 2024-02-27 PROBLEM — L60.1 ONYCHOLYSIS OF TOENAIL: Status: RESOLVED | Noted: 2023-10-07 | Resolved: 2024-02-27

## 2024-02-27 PROBLEM — R63.2 POLYPHAGIA: Status: RESOLVED | Noted: 2023-10-07 | Resolved: 2024-02-27

## 2024-02-27 PROCEDURE — 84460 ALANINE AMINO (ALT) (SGPT): CPT | Performed by: PHYSICIAN ASSISTANT

## 2024-02-27 PROCEDURE — G0402 INITIAL PREVENTIVE EXAM: HCPCS | Performed by: PHYSICIAN ASSISTANT

## 2024-02-27 PROCEDURE — 1170F FXNL STATUS ASSESSED: CPT | Performed by: PHYSICIAN ASSISTANT

## 2024-02-27 PROCEDURE — 1036F TOBACCO NON-USER: CPT | Performed by: PHYSICIAN ASSISTANT

## 2024-02-27 PROCEDURE — 82306 VITAMIN D 25 HYDROXY: CPT | Performed by: PHYSICIAN ASSISTANT

## 2024-02-27 PROCEDURE — 3075F SYST BP GE 130 - 139MM HG: CPT | Performed by: PHYSICIAN ASSISTANT

## 2024-02-27 PROCEDURE — 1159F MED LIST DOCD IN RCRD: CPT | Performed by: PHYSICIAN ASSISTANT

## 2024-02-27 PROCEDURE — 83036 HEMOGLOBIN GLYCOSYLATED A1C: CPT | Performed by: PHYSICIAN ASSISTANT

## 2024-02-27 PROCEDURE — 3008F BODY MASS INDEX DOCD: CPT | Performed by: PHYSICIAN ASSISTANT

## 2024-02-27 PROCEDURE — 80061 LIPID PANEL: CPT | Performed by: PHYSICIAN ASSISTANT

## 2024-02-27 PROCEDURE — 80048 BASIC METABOLIC PNL TOTAL CA: CPT | Performed by: PHYSICIAN ASSISTANT

## 2024-02-27 PROCEDURE — 99214 OFFICE O/P EST MOD 30 MIN: CPT | Performed by: PHYSICIAN ASSISTANT

## 2024-02-27 PROCEDURE — 1125F AMNT PAIN NOTED PAIN PRSNT: CPT | Performed by: PHYSICIAN ASSISTANT

## 2024-02-27 PROCEDURE — 1160F RVW MEDS BY RX/DR IN RCRD: CPT | Performed by: PHYSICIAN ASSISTANT

## 2024-02-27 PROCEDURE — 3080F DIAST BP >= 90 MM HG: CPT | Performed by: PHYSICIAN ASSISTANT

## 2024-02-27 PROCEDURE — 85025 COMPLETE CBC W/AUTO DIFF WBC: CPT | Performed by: PHYSICIAN ASSISTANT

## 2024-02-27 PROCEDURE — 84450 TRANSFERASE (AST) (SGOT): CPT | Performed by: PHYSICIAN ASSISTANT

## 2024-02-27 RX ORDER — TRIAMTERENE/HYDROCHLOROTHIAZID 37.5-25 MG
1 TABLET ORAL DAILY
Qty: 90 TABLET | Refills: 3 | Status: SHIPPED | OUTPATIENT
Start: 2024-02-27

## 2024-02-27 RX ORDER — TOPIRAMATE 25 MG/1
TABLET ORAL
Qty: 90 TABLET | Refills: 1 | Status: SHIPPED | OUTPATIENT
Start: 2024-02-27 | End: 2024-03-20 | Stop reason: SDUPTHER

## 2024-02-27 ASSESSMENT — ENCOUNTER SYMPTOMS
FREQUENCY: 0
CONSTIPATION: 0
CHOKING: 0
LOSS OF SENSATION IN FEET: 0
COUGH: 0
DIZZINESS: 0
FATIGUE: 0
COLOR CHANGE: 0
EYE DISCHARGE: 0
ARTHRALGIAS: 0
SLEEP DISTURBANCE: 0
POLYPHAGIA: 0
HEADACHES: 0
OCCASIONAL FEELINGS OF UNSTEADINESS: 1
WHEEZING: 0
NUMBNESS: 0
WEAKNESS: 0
NERVOUS/ANXIOUS: 0
NAUSEA: 0
SHORTNESS OF BREATH: 0
DIARRHEA: 0
CHILLS: 0
FEVER: 0
CHEST TIGHTNESS: 0
DEPRESSION: 0
MYALGIAS: 0
JOINT SWELLING: 0
ABDOMINAL DISTENTION: 0
DIFFICULTY URINATING: 0
HEMATURIA: 0
EYE PAIN: 0
SORE THROAT: 0
ABDOMINAL PAIN: 0
VOMITING: 0
TREMORS: 0
ANAL BLEEDING: 0
FACIAL SWELLING: 0
POLYDIPSIA: 0
PALPITATIONS: 0
CONFUSION: 0
APPETITE CHANGE: 0

## 2024-02-27 ASSESSMENT — ACTIVITIES OF DAILY LIVING (ADL)
GROCERY SHOPPING: INDEPENDENT
EATING: INDEPENDENT
TAKING_MEDICATION: INDEPENDENT
MANAGING FINANCES: INDEPENDENT
ADEQUATE_TO_COMPLETE_ADL: YES
DOING_HOUSEWORK: INDEPENDENT
USING TELEPHONE: INDEPENDENT
TOILETING: INDEPENDENT
HEARING - LEFT EAR: FUNCTIONAL
WALKS IN HOME: INDEPENDENT
BATHING: INDEPENDENT
DRESSING: INDEPENDENT
DOING HOUSEWORK: INDEPENDENT
HEARING - RIGHT EAR: FUNCTIONAL
DRESSING: INDEPENDENT
PILL BOX USED: YES
MANAGING_FINANCES: INDEPENDENT
NEEDS ASSISTANCE WITH FOOD: INDEPENDENT
PATIENT'S MEMORY ADEQUATE TO SAFELY COMPLETE DAILY ACTIVITIES?: YES
USING TRANSPORTATION: INDEPENDENT
TOILETING: INDEPENDENT
BATHING: INDEPENDENT
ASSISTIVE_DEVICE: CANE;WALKER;SHOWER CHAIR
JUDGMENT_ADEQUATE_SAFELY_COMPLETE_DAILY_ACTIVITIES: YES
FEEDING YOURSELF: INDEPENDENT
BATHING: INDEPENDENT
GROOMING: INDEPENDENT
PREPARING MEALS: INDEPENDENT
FEEDING: INDEPENDENT
TAKING MEDICATION: INDEPENDENT
STIL DRIVING: YES
ADEQUATE_TO_COMPLETE_ADL: YES
DRESSING YOURSELF: INDEPENDENT
JUDGMENT_ADEQUATE_SAFELY_COMPLETE_DAILY_ACTIVITIES: YES
GROCERY_SHOPPING: INDEPENDENT

## 2024-02-27 ASSESSMENT — GERIATRIC MINI NUTRITIONAL ASSESSMENT (MNA)
B WEIGHT LOSS DURING THE LAST 3 MONTHS: NO WEIGHT LOSS
D HAS SUFFERED PSYCHOLOGICAL STRESS OR ACUTE DISEASE IN THE PAST 3 MONTHS?: NO
A HAS FOOD INTAKE DECLINED OVER THE PAST 3 MONTHS DUE TO LOSS OF APPETITE, DIGESTIVE PROBLEMS, CHEWING OR SWALLOWING DIFFICULTIES?: NO DECREASE IN FOOD INTAKE
E NEUROPSYCHOLOGICAL PROBLEMS: NO PSYCHOLOGICAL PROBLEMS

## 2024-02-27 ASSESSMENT — PATIENT HEALTH QUESTIONNAIRE - PHQ9
1. LITTLE INTEREST OR PLEASURE IN DOING THINGS: NOT AT ALL
SUM OF ALL RESPONSES TO PHQ9 QUESTIONS 1 AND 2: 0
2. FEELING DOWN, DEPRESSED OR HOPELESS: NOT AT ALL

## 2024-02-27 ASSESSMENT — COLUMBIA-SUICIDE SEVERITY RATING SCALE - C-SSRS
2. HAVE YOU ACTUALLY HAD ANY THOUGHTS OF KILLING YOURSELF?: NO
6. HAVE YOU EVER DONE ANYTHING, STARTED TO DO ANYTHING, OR PREPARED TO DO ANYTHING TO END YOUR LIFE?: NO
1. IN THE PAST MONTH, HAVE YOU WISHED YOU WERE DEAD OR WISHED YOU COULD GO TO SLEEP AND NOT WAKE UP?: NO

## 2024-02-27 ASSESSMENT — PAIN SCALES - GENERAL: PAINLEVEL: 4

## 2024-02-27 NOTE — PROGRESS NOTES
Subjective   Patient ID: Felecia Alejo is a 64 y.o. female with a history of hypertension, leg edema, diabetes type 2, dyslipidemia, obesity, GERD, chronic low back pain, chronic pain of both knees, s/p hysterectomy who presents for Follow-up and Urinary Frequency.    HPI The patient is complaining of frequent urination going almost every 25-30 min since she is on furosemide.  Her leg edema is a little better controlled.  It is worse in the evening.  Stated she tries to wear compression stockings as much as she can. She sits more than she walks.  She denies shortness of breath on rest or exertion.  There is no chest pains.  Her echocardiogram was done on 10/9/2023 which was normal.  The patient did not take topiramate as was directed and was on 50 mg for couple of months instead of 75 mg.  She lost 3 pounds only.    Review of Systems   Constitutional:  Negative for appetite change, chills, fatigue and fever.   HENT:  Negative for congestion, ear pain, facial swelling, hearing loss, nosebleeds and sore throat.    Eyes:  Negative for pain, discharge and visual disturbance.   Respiratory:  Negative for cough, choking, chest tightness, shortness of breath and wheezing.    Cardiovascular:  Positive for leg swelling. Negative for chest pain and palpitations.   Gastrointestinal:  Negative for abdominal distention, abdominal pain, anal bleeding, constipation, diarrhea, nausea and vomiting.   Endocrine: Negative for cold intolerance, heat intolerance, polydipsia, polyphagia and polyuria.   Genitourinary:  Negative for difficulty urinating, frequency, hematuria and urgency.   Musculoskeletal:  Negative for arthralgias, gait problem, joint swelling and myalgias.   Skin:  Negative for color change and rash.   Neurological:  Negative for dizziness, tremors, syncope, weakness, numbness and headaches.   Psychiatric/Behavioral:  Negative for behavioral problems, confusion, sleep disturbance and suicidal ideas. The patient is not  "nervous/anxious.        Objective   /90   Temp 36 °C (96.8 °F)   Ht 1.575 m (5' 2\")   Wt 118 kg (260 lb)   Breastfeeding No   BMI 47.55 kg/m²     Physical Exam  Constitutional:       General: She is not in acute distress.     Appearance: Normal appearance.   HENT:      Head: Normocephalic and atraumatic.      Nose: Nose normal.   Eyes:      Extraocular Movements: Extraocular movements intact.      Conjunctiva/sclera: Conjunctivae normal.      Pupils: Pupils are equal, round, and reactive to light.   Cardiovascular:      Rate and Rhythm: Normal rate and regular rhythm.      Pulses: Normal pulses.      Heart sounds: Normal heart sounds.   Pulmonary:      Effort: Pulmonary effort is normal.      Breath sounds: Normal breath sounds.   Abdominal:      General: Bowel sounds are normal.      Palpations: Abdomen is soft.   Musculoskeletal:         General: Normal range of motion.      Cervical back: Normal range of motion and neck supple.      Right lower leg: Edema present.      Left lower leg: Edema present.   Neurological:      General: No focal deficit present.      Mental Status: She is alert and oriented to person, place, and time.   Psychiatric:         Mood and Affect: Mood normal.         Behavior: Behavior normal.         Thought Content: Thought content normal.         Judgment: Judgment normal.         Assessment/Plan   leg edema bilaterally  Unable to tolerate furosemide due to frequent urination  Stop furosemide  Start triamterene-HCTZ 37.5-25 mg daily  Continue to wear compression stockings  Continue to elevate feet  echocardiogram was done on 10/9/2023, normal.  Consult vascular surgery     High blood pressure   Start triamterene-HCTZ 37.5-25 mg daily  Stop furosemide 40 mg  Monitor blood pressure daily  No added salt diet, do not add salt to your food  Try to exercise every other day for 30 minutes     Diabetes type II  Well controlled since on a diet  Continue low carbohydrate diet, exercise " daily  Follow up with ophthalmology as scheduled    Pulmonary nodule  Two nodules 6.7 mm and 4.5 mm in left lower lobe, accidental finding on abdominal CT scan  CT chest, requisition is given    Depression  Stable without medications  Patient was instructed to come back to the office if her depression gets worse    Chronic pain of both knees  Knee replacement is recommended  not a good candidate since BMI is over 42  declined bariatric surgery  Take topiramate as prescribed for weight loss and pains  Had steroidal injection with much improvement  Do not take any NSAIDs such as Aleve, ibuprofen, Motrin or naproxen  Take Tylenol extra strength as needed for pain twice daily  Apply heating pads and topical cream  Follow-up with orthopedic surgery Dr. King      Low back pain  stable  Core exercises  Do not take NSAIDs  Tylenol as needed for pain  Apply heating pads  Apply topical cream such as Biofreeze or Voltaren     Muscle spasms  Improved  Off of magnesium    Morbid obesity  Body mass index is 47.55 kg/m².  Elevated BMI, ideal BMI is between 23-26 kg/m2  Weight loss recommended  Ozempic was too expensive  Take topiramate 25 mg as directed  Low fat, low cholesterol diet, low carbohydrate diet  Exercise as much as you can     Wellness exam  Flu vaccine declined  COVID-vaccine up-to-date  Tdap 2018  Mammogram 10/3/2023, benign  DEXA bone density 10/10/2023, normal  Shingrix, Prevnar 20 recommended to have it done at the pharmacy  Colonoscopy done on 11/23/2022, diverticulosis, repeat in 10 years  Follow-up with ophthalmology once a year  Follow-up with dermatology once a year for skin check  Follow-up with dentist twice a year for dental cleaning    Follow-up in 2 months

## 2024-02-27 NOTE — PROGRESS NOTES
"Subjective   Reason for Visit: Felecia Alejo is an 64 y.o. female here for a Medicare Wellness visit.     Past Medical, Surgical, and Family History reviewed and updated in chart.    Reviewed all medications by prescribing practitioner or clinical pharmacist (such as prescriptions, OTCs, herbal therapies and supplements) and documented in the medical record.    HPI    Patient Care Team:  Leyla Bella PA-C as PCP - General (Internal Medicine)     Review of Systems    Objective   Vitals:  /90   Temp 36 °C (96.8 °F)   Ht 1.575 m (5' 2\")   Wt 118 kg (260 lb)   Breastfeeding No   BMI 47.55 kg/m²       Physical Exam    Assessment/Plan   Problem List Items Addressed This Visit       Venous insufficiency of both lower extremities - Primary    Relevant Orders    Referral to Vascular Surgery    Prediabetes    Relevant Orders    Hemoglobin A1C    Obesity, Class III, BMI 40-49.9 (morbid obesity) (CMS/HCC)    Relevant Medications    topiramate (Topamax) 25 mg tablet    Mild vitamin D deficiency    Relevant Orders    Vitamin D 25-Hydroxy,Total (for eval of Vitamin D levels)    Hyperlipidemia    Relevant Orders    Alanine Aminotransferase    Aspartate Aminotransferase    Lipid Panel    Diabetes mellitus, type 2 (CMS/HCC)    Depression, recurrent (CMS/HCC)    Class 3 severe obesity due to excess calories with body mass index (BMI) of 45.0 to 49.9 in adult (CMS/HCC)    Bilateral leg edema    Benign essential hypertension    Relevant Medications    triamterene-hydrochlorothiazid (Maxzide-25) 37.5-25 mg tablet    Other Relevant Orders    Basic Metabolic Panel    CBC     Other Visit Diagnoses       Routine general medical examination at health care facility        Relevant Orders    1 Year Follow Up In Primary Care - Wellness Exam               "

## 2024-03-20 DIAGNOSIS — E66.01 OBESITY, CLASS III, BMI 40-49.9 (MORBID OBESITY) (MULTI): ICD-10-CM

## 2024-03-20 RX ORDER — TOPIRAMATE 25 MG/1
25 TABLET ORAL DAILY
Qty: 30 TABLET | Refills: 1 | OUTPATIENT
Start: 2024-03-20

## 2024-03-20 RX ORDER — TOPIRAMATE 25 MG/1
25 TABLET ORAL DAILY
Qty: 30 TABLET | Refills: 1 | Status: SHIPPED | OUTPATIENT
Start: 2024-03-20 | End: 2024-04-23

## 2024-03-20 RX ORDER — TOPIRAMATE 50 MG/1
50 TABLET, FILM COATED ORAL DAILY
Qty: 30 TABLET | Refills: 5 | Status: SHIPPED | OUTPATIENT
Start: 2024-03-20 | End: 2024-05-23 | Stop reason: ALTCHOICE

## 2024-03-20 RX ORDER — TOPIRAMATE 25 MG/1
TABLET ORAL
Qty: 270 TABLET | Refills: 1 | OUTPATIENT
Start: 2024-03-20

## 2024-04-04 ENCOUNTER — HOSPITAL ENCOUNTER (OUTPATIENT)
Dept: RADIOLOGY | Facility: HOSPITAL | Age: 64
End: 2024-04-04
Payer: MEDICARE

## 2024-04-08 ENCOUNTER — HOSPITAL ENCOUNTER (OUTPATIENT)
Dept: RADIOLOGY | Facility: HOSPITAL | Age: 64
End: 2024-04-08

## 2024-04-23 DIAGNOSIS — E66.01 OBESITY, CLASS III, BMI 40-49.9 (MORBID OBESITY) (MULTI): ICD-10-CM

## 2024-04-23 DIAGNOSIS — E66.01 CLASS 3 SEVERE OBESITY DUE TO EXCESS CALORIES WITHOUT SERIOUS COMORBIDITY WITH BODY MASS INDEX (BMI) OF 45.0 TO 49.9 IN ADULT (MULTI): Primary | ICD-10-CM

## 2024-04-23 RX ORDER — TOPIRAMATE 50 MG/1
50 TABLET, FILM COATED ORAL DAILY
Qty: 90 TABLET | Refills: 1 | OUTPATIENT
Start: 2024-04-23 | End: 2024-10-20

## 2024-04-23 RX ORDER — TOPIRAMATE 100 MG/1
100 TABLET, FILM COATED ORAL DAILY
Qty: 30 TABLET | Refills: 5 | Status: SHIPPED | OUTPATIENT
Start: 2024-04-23 | End: 2024-10-20

## 2024-04-29 ENCOUNTER — APPOINTMENT (OUTPATIENT)
Dept: PRIMARY CARE | Facility: CLINIC | Age: 64
End: 2024-04-29
Payer: MEDICARE

## 2024-05-09 ENCOUNTER — OFFICE VISIT (OUTPATIENT)
Dept: ORTHOPEDIC SURGERY | Facility: CLINIC | Age: 64
End: 2024-05-09
Payer: MEDICARE

## 2024-05-09 DIAGNOSIS — M17.0 PRIMARY OSTEOARTHRITIS OF BOTH KNEES: Primary | ICD-10-CM

## 2024-05-09 PROCEDURE — 1159F MED LIST DOCD IN RCRD: CPT | Performed by: ORTHOPAEDIC SURGERY

## 2024-05-09 PROCEDURE — 1160F RVW MEDS BY RX/DR IN RCRD: CPT | Performed by: ORTHOPAEDIC SURGERY

## 2024-05-09 PROCEDURE — 20610 DRAIN/INJ JOINT/BURSA W/O US: CPT | Performed by: ORTHOPAEDIC SURGERY

## 2024-05-09 PROCEDURE — 2500000004 HC RX 250 GENERAL PHARMACY W/ HCPCS (ALT 636 FOR OP/ED): Performed by: ORTHOPAEDIC SURGERY

## 2024-05-09 PROCEDURE — 2500000005 HC RX 250 GENERAL PHARMACY W/O HCPCS: Performed by: ORTHOPAEDIC SURGERY

## 2024-05-09 PROCEDURE — 3008F BODY MASS INDEX DOCD: CPT | Performed by: ORTHOPAEDIC SURGERY

## 2024-05-09 PROCEDURE — 20610 DRAIN/INJ JOINT/BURSA W/O US: CPT | Mod: 50 | Performed by: ORTHOPAEDIC SURGERY

## 2024-05-09 RX ORDER — LIDOCAINE HYDROCHLORIDE 10 MG/ML
4 INJECTION INFILTRATION; PERINEURAL
Status: COMPLETED | OUTPATIENT
Start: 2024-05-09 | End: 2024-05-09

## 2024-05-09 RX ORDER — TRIAMCINOLONE ACETONIDE 40 MG/ML
1 INJECTION, SUSPENSION INTRA-ARTICULAR; INTRAMUSCULAR
Status: COMPLETED | OUTPATIENT
Start: 2024-05-09 | End: 2024-05-09

## 2024-05-09 RX ADMIN — TRIAMCINOLONE ACETONIDE 1 ML: 40 INJECTION, SUSPENSION INTRA-ARTICULAR; INTRAMUSCULAR at 10:53

## 2024-05-09 RX ADMIN — LIDOCAINE HYDROCHLORIDE 4 ML: 10 INJECTION, SOLUTION INFILTRATION; PERINEURAL at 10:53

## 2024-05-09 ASSESSMENT — PAIN - FUNCTIONAL ASSESSMENT: PAIN_FUNCTIONAL_ASSESSMENT: NO/DENIES PAIN

## 2024-05-09 NOTE — PROGRESS NOTES
L Inj/Asp: bilateral knee on 5/9/2024 10:53 AM  Indications: pain and joint swelling  Details: 22 G needle, anterolateral approach  Medications (Right): 1 mL triamcinolone acetonide 40 mg/mL; 4 mL lidocaine 10 mg/mL (1 %)  Medications (Left): 1 mL triamcinolone acetonide 40 mg/mL; 4 mL lidocaine 10 mg/mL (1 %)    Discussion:  I discussed the conservative treatment options for knee osteoarthritis including but not limited to physical therapy, oral NSAIDS, activity and lifestyle modification, and corticosteroid injections. Pt has elected to undergo a cortisone injection today. I have explained the risk and benefits of an injection including the possibility of joint infection, bleeding, damage to cartilage, allergic reaction. Patient verbalized understanding and gave verbal consent wishes to proceed with a intra-articular cortisone injection for their knee.    Procedure:  After discussing the risk and benefits of the procedure, we proceeded with an intra-articular bilateral knee injection.    With the patient's informed verbal consent, the bilateral knees were prepped in standard sterile fashion with Chlorhexidine. The skin was then anesthetized with ethyl chloride spray and cleaned again with Chlorhexidine. The bilateral knees were then apirated/injected with a prefilled 20-gauge syringe of 40 mg Kenalog + 4 ml Lidocaine using the lateral approach without complications.  The patient tolerated this well and felt immediate initial relief of symptoms. A bandaid was applied and the patient ambulated out of the clinic on ther own accord without difficulty. Patient was instructed to avoid physical activity for 24-48 hours to prevent the knees from swelling and may ice the knees as tolerated. Patient should contact the office if any signs of of infection appear: redness, fever, chills, drainage, swelling or warmth to the knees.  Pt understands that the injections can be repeated no sooner than 3 months.      Procedure,  treatment alternatives, risks and benefits explained, specific risks discussed. Consent was given by the patient. Immediately prior to procedure a time out was called to verify the correct patient, procedure, equipment, support staff and site/side marked as required. Patient was prepped and draped in the usual sterile fashion.

## 2024-05-21 DIAGNOSIS — Z71.89 BEREAVEMENT COUNSELING: Primary | ICD-10-CM

## 2024-05-23 ENCOUNTER — TELEMEDICINE (OUTPATIENT)
Dept: PRIMARY CARE | Facility: CLINIC | Age: 64
End: 2024-05-23
Payer: MEDICARE

## 2024-05-23 DIAGNOSIS — F33.9 DEPRESSION, RECURRENT (CMS-HCC): Primary | ICD-10-CM

## 2024-05-23 DIAGNOSIS — R05.1 ACUTE COUGH: ICD-10-CM

## 2024-05-23 PROCEDURE — 3008F BODY MASS INDEX DOCD: CPT | Performed by: PHYSICIAN ASSISTANT

## 2024-05-23 PROCEDURE — 1159F MED LIST DOCD IN RCRD: CPT | Performed by: PHYSICIAN ASSISTANT

## 2024-05-23 PROCEDURE — 1036F TOBACCO NON-USER: CPT | Performed by: PHYSICIAN ASSISTANT

## 2024-05-23 PROCEDURE — 99214 OFFICE O/P EST MOD 30 MIN: CPT | Performed by: PHYSICIAN ASSISTANT

## 2024-05-23 PROCEDURE — 1160F RVW MEDS BY RX/DR IN RCRD: CPT | Performed by: PHYSICIAN ASSISTANT

## 2024-05-23 RX ORDER — AZITHROMYCIN 250 MG/1
TABLET, FILM COATED ORAL DAILY
Qty: 6 TABLET | Refills: 0 | Status: SHIPPED | OUTPATIENT
Start: 2024-05-23 | End: 2024-05-28

## 2024-05-23 RX ORDER — ESCITALOPRAM OXALATE 5 MG/1
5 TABLET ORAL DAILY
Qty: 30 TABLET | Refills: 2 | Status: SHIPPED | OUTPATIENT
Start: 2024-05-23 | End: 2024-08-21

## 2024-05-23 RX ORDER — BENZONATATE 100 MG/1
100 CAPSULE ORAL 3 TIMES DAILY PRN
Qty: 42 CAPSULE | Refills: 0 | Status: SHIPPED | OUTPATIENT
Start: 2024-05-23 | End: 2024-06-22

## 2024-05-23 ASSESSMENT — ENCOUNTER SYMPTOMS
TREMORS: 0
SLEEP DISTURBANCE: 0
JOINT SWELLING: 0
NAUSEA: 0
COUGH: 1
ARTHRALGIAS: 0
FATIGUE: 1
HEMATURIA: 0
CONSTIPATION: 0
DIZZINESS: 0
NUMBNESS: 0
FACIAL SWELLING: 0
CHOKING: 0
DIFFICULTY URINATING: 0
HEADACHES: 0
SORE THROAT: 0
PALPITATIONS: 0
NERVOUS/ANXIOUS: 0
ANAL BLEEDING: 0
POLYDIPSIA: 0
WHEEZING: 0
MYALGIAS: 0
VOMITING: 0
FREQUENCY: 0
FEVER: 0
CHEST TIGHTNESS: 0
DIARRHEA: 0
WEAKNESS: 0
EYE PAIN: 0
CHILLS: 1
APPETITE CHANGE: 0
SHORTNESS OF BREATH: 0
POLYPHAGIA: 0
ABDOMINAL PAIN: 0
ABDOMINAL DISTENTION: 0
CONFUSION: 0
EYE DISCHARGE: 0
COLOR CHANGE: 0

## 2024-05-23 ASSESSMENT — PATIENT HEALTH QUESTIONNAIRE - PHQ9
SUM OF ALL RESPONSES TO PHQ QUESTIONS 1-9: 3
4. FEELING TIRED OR HAVING LITTLE ENERGY: NOT AT ALL
7. TROUBLE CONCENTRATING ON THINGS, SUCH AS READING THE NEWSPAPER OR WATCHING TELEVISION: NOT AT ALL
5. POOR APPETITE OR OVEREATING: NOT AT ALL
10. IF YOU CHECKED OFF ANY PROBLEMS, HOW DIFFICULT HAVE THESE PROBLEMS MADE IT FOR YOU TO DO YOUR WORK, TAKE CARE OF THINGS AT HOME, OR GET ALONG WITH OTHER PEOPLE: SOMEWHAT DIFFICULT
6. FEELING BAD ABOUT YOURSELF - OR THAT YOU ARE A FAILURE OR HAVE LET YOURSELF OR YOUR FAMILY DOWN: NOT AT ALL
3. TROUBLE FALLING OR STAYING ASLEEP OR SLEEPING TOO MUCH: NOT AT ALL
SUM OF ALL RESPONSES TO PHQ9 QUESTIONS 1 AND 2: 3
8. MOVING OR SPEAKING SO SLOWLY THAT OTHER PEOPLE COULD HAVE NOTICED. OR THE OPPOSITE, BEING SO FIGETY OR RESTLESS THAT YOU HAVE BEEN MOVING AROUND A LOT MORE THAN USUAL: NOT AT ALL
9. THOUGHTS THAT YOU WOULD BE BETTER OFF DEAD, OR OF HURTING YOURSELF: NOT AT ALL
1. LITTLE INTEREST OR PLEASURE IN DOING THINGS: SEVERAL DAYS
2. FEELING DOWN, DEPRESSED OR HOPELESS: MORE THAN HALF THE DAYS

## 2024-05-23 NOTE — PROGRESS NOTES
Subjective   Patient ID: Felecia Alejo is a 64 y.o. female with a history of hypertension, leg edema, diabetes type 2, dyslipidemia, obesity, GERD, chronic low back pain, chronic pain of both knees, s/p hysterectomy, history of alcohol abuse sober June 1989 who presents for Cough and Depression.    Virtual or Telephone Consent    An interactive audio and video telecommunication system which permits real time communications between the patient (at the originating site) and provider (at the distant site) was utilized to provide this telehealth service.   Verbal consent was requested and obtained from Felecia Alejo on this date, 05/23/24 for a telehealth visit.       HPI The patient is complaining of cough, nasal congestion with yellow nasal discharge, chills and fatigue which she developed  6 days ago. She denies shortness of breath, wheezing, or difficulty breathing.  The patient has been feeling sad and down since she had a few deaths in her family last being this month. She has crying spells but denies suicidal or homicidal ideations.  She has upcoming psychotherapy this coming Monday.    Review of Systems   Constitutional:  Positive for chills and fatigue. Negative for appetite change and fever.   HENT:  Positive for congestion. Negative for ear pain, facial swelling, hearing loss, nosebleeds and sore throat.    Eyes:  Negative for pain, discharge and visual disturbance.   Respiratory:  Positive for cough. Negative for choking, chest tightness, shortness of breath and wheezing.    Cardiovascular:  Negative for chest pain, palpitations and leg swelling.   Gastrointestinal:  Negative for abdominal distention, abdominal pain, anal bleeding, constipation, diarrhea, nausea and vomiting.   Endocrine: Negative for cold intolerance, heat intolerance, polydipsia, polyphagia and polyuria.   Genitourinary:  Negative for difficulty urinating, frequency, hematuria and urgency.   Musculoskeletal:  Negative for arthralgias,  gait problem, joint swelling and myalgias.   Skin:  Negative for color change and rash.   Neurological:  Negative for dizziness, tremors, syncope, weakness, numbness and headaches.   Psychiatric/Behavioral:  Negative for behavioral problems, confusion, sleep disturbance and suicidal ideas. The patient is not nervous/anxious.         Depression       Objective   There were no vitals taken for this visit.    Physical Exam    Assessment/Plan   Acute bronchitis  Start Z-Grant as directed  Take benzonatate 100 mg 3 times a day as needed    Depression  worsening  Start Lexapro 5 mg daily  Reviewed side effects of medications  Patient voiced full understanding of side effects  call if any side effects    leg edema bilaterally  Unable to tolerate furosemide due to frequent urination  Continue triamterene-HCTZ 37.5-25 mg daily  Continue to wear compression stockings  Continue to elevate feet  echocardiogram was done on 10/9/2023, normal.  Consult vascular surgery     High blood pressure   Continue with current show cardiac referral  Continue triamterene-HCTZ 37.5-25 mg daily  Monitor blood pressure daily  No added salt diet, do not add salt to your food  Try to exercise every other day for 30 minutes     Diabetes type II  Well controlled since on a diet  Continue low carbohydrate diet, exercise daily  Follow up with ophthalmology as scheduled    Pulmonary nodule  Two nodules 6.7 mm and 4.5 mm in left lower lobe, accidental finding on abdominal CT scan  CT chest 10/2/23, repeat in a year     Chronic pain of both knees  Knee replacement is recommended  not a good candidate since BMI is over 42  declined bariatric surgery  Take topiramate as prescribed for weight loss and pains  Had steroidal injection with much improvement  Do not take any NSAIDs such as Aleve, ibuprofen, Motrin or naproxen  Take Tylenol extra strength as needed for pain twice daily  Apply heating pads and topical cream  Follow-up with orthopedic surgery   [place]      Low back pain  stable  Core exercises  Do not take NSAIDs  Tylenol as needed for pain  Apply heating pads  Apply topical cream such as Biofreeze or Voltaren     Muscle spasms  Improved  Off of magnesium    Morbid obesity  There is no height or weight on file to calculate BMI.  Elevated BMI, ideal BMI is between 23-26 kg/m2  Weight loss recommended  Ozempic was too expensive  Take topiramate 25 mg as directed  Low fat, low cholesterol diet, low carbohydrate diet  Exercise as much as you can    Wellness exam  Flu vaccine declined  COVID-vaccine up-to-date  Tdap 2018  Mammogram 10/3/2023, benign  DEXA bone density 10/10/2023, normal  Shingrix, Prevnar 20 recommended to have it done at the pharmacy  Colonoscopy done on 11/23/2022, diverticulosis, repeat in 10 years  Follow-up with ophthalmology once a year  Follow-up with dermatology once a year for skin check  Follow-up with dentist twice a year for dental cleaning    Follow-up in 4 weeks

## 2024-05-29 ENCOUNTER — APPOINTMENT (OUTPATIENT)
Dept: PRIMARY CARE | Facility: CLINIC | Age: 64
End: 2024-05-29
Payer: MEDICARE

## 2024-05-30 ENCOUNTER — APPOINTMENT (OUTPATIENT)
Dept: PRIMARY CARE | Facility: CLINIC | Age: 64
End: 2024-05-30
Payer: MEDICARE

## 2024-06-13 ENCOUNTER — HOSPITAL ENCOUNTER (OUTPATIENT)
Dept: RADIOLOGY | Facility: HOSPITAL | Age: 64
Discharge: HOME | End: 2024-06-13
Payer: MEDICARE

## 2024-06-13 DIAGNOSIS — E78.00 PURE HYPERCHOLESTEROLEMIA: ICD-10-CM

## 2024-06-13 PROCEDURE — 75571 CT HRT W/O DYE W/CA TEST: CPT

## 2024-06-14 DIAGNOSIS — F33.9 DEPRESSION, RECURRENT (CMS-HCC): ICD-10-CM

## 2024-06-14 RX ORDER — ESCITALOPRAM OXALATE 5 MG/1
5 TABLET ORAL DAILY
Qty: 90 TABLET | Refills: 1 | Status: SHIPPED | OUTPATIENT
Start: 2024-06-14

## 2024-06-18 DIAGNOSIS — R93.1 ABNORMAL HEART SCORE CT: Primary | ICD-10-CM

## 2024-06-19 DIAGNOSIS — E66.01 OBESITY, CLASS III, BMI 40-49.9 (MORBID OBESITY) (MULTI): ICD-10-CM

## 2024-06-19 DIAGNOSIS — R60.0 BILATERAL LEG EDEMA: ICD-10-CM

## 2024-06-19 NOTE — TELEPHONE ENCOUNTER
----- Message from Leyla Bella PA-C sent at 6/18/2024  4:38 PM EDT -----  Please call the patient with CT cardiac score results.  Her cardiac score is 159.1 which is elevated.  She needs to be evaluated by cardiology.  I placed the referral.  Please give her patient  phone number to call and schedule.

## 2024-06-20 RX ORDER — TOPIRAMATE 25 MG/1
75 TABLET ORAL DAILY
Qty: 90 TABLET | Refills: 3 | Status: SHIPPED | OUTPATIENT
Start: 2024-06-20 | End: 2024-06-21 | Stop reason: ALTCHOICE

## 2024-06-20 RX ORDER — FUROSEMIDE 40 MG/1
40 TABLET ORAL DAILY
Qty: 90 TABLET | Refills: 1 | Status: SHIPPED | OUTPATIENT
Start: 2024-06-20

## 2024-06-21 ENCOUNTER — APPOINTMENT (OUTPATIENT)
Dept: PRIMARY CARE | Facility: CLINIC | Age: 64
End: 2024-06-21
Payer: MEDICARE

## 2024-06-21 VITALS
SYSTOLIC BLOOD PRESSURE: 118 MMHG | BODY MASS INDEX: 46.01 KG/M2 | HEIGHT: 62 IN | WEIGHT: 250 LBS | DIASTOLIC BLOOD PRESSURE: 78 MMHG

## 2024-06-21 DIAGNOSIS — R60.0 BILATERAL LEG EDEMA: ICD-10-CM

## 2024-06-21 DIAGNOSIS — E78.5 DYSLIPIDEMIA: ICD-10-CM

## 2024-06-21 DIAGNOSIS — E11.9 TYPE 2 DIABETES MELLITUS WITHOUT COMPLICATION, WITHOUT LONG-TERM CURRENT USE OF INSULIN (MULTI): ICD-10-CM

## 2024-06-21 DIAGNOSIS — I10 BENIGN ESSENTIAL HYPERTENSION: Primary | ICD-10-CM

## 2024-06-21 DIAGNOSIS — E78.00 PURE HYPERCHOLESTEROLEMIA: ICD-10-CM

## 2024-06-21 DIAGNOSIS — I87.8 CHRONIC VENOUS STASIS: ICD-10-CM

## 2024-06-21 DIAGNOSIS — E87.6 HYPOKALEMIA: Primary | ICD-10-CM

## 2024-06-21 DIAGNOSIS — E66.01 CLASS 3 SEVERE OBESITY DUE TO EXCESS CALORIES WITHOUT SERIOUS COMORBIDITY WITH BODY MASS INDEX (BMI) OF 45.0 TO 49.9 IN ADULT (MULTI): ICD-10-CM

## 2024-06-21 PROBLEM — R05.1 ACUTE COUGH: Status: RESOLVED | Noted: 2024-05-23 | Resolved: 2024-06-21

## 2024-06-21 LAB
ALT SERPL W P-5'-P-CCNC: 12 U/L (ref 7–45)
ANION GAP SERPL CALC-SCNC: 12 MMOL/L (ref 10–20)
AST SERPL W P-5'-P-CCNC: 13 U/L (ref 9–39)
BUN SERPL-MCNC: 16 MG/DL (ref 6–23)
CALCIUM SERPL-MCNC: 9.4 MG/DL (ref 8.6–10.6)
CHLORIDE SERPL-SCNC: 107 MMOL/L (ref 98–107)
CHOLEST SERPL-MCNC: 265 MG/DL (ref 0–199)
CHOLESTEROL/HDL RATIO: 4.8
CO2 SERPL-SCNC: 27 MMOL/L (ref 21–32)
CREAT SERPL-MCNC: 0.97 MG/DL (ref 0.5–1.05)
CREAT UR STRIP-MCNC: 300 MG/DL
EGFRCR SERPLBLD CKD-EPI 2021: 65 ML/MIN/1.73M*2
ERYTHROCYTE [DISTWIDTH] IN BLOOD BY AUTOMATED COUNT: 16.9 % (ref 11.5–14.5)
EST. AVERAGE GLUCOSE BLD GHB EST-MCNC: 131 MG/DL
GLUCOSE SERPL-MCNC: 112 MG/DL (ref 74–99)
HBA1C MFR BLD: 6.2 %
HCT VFR BLD AUTO: 38 % (ref 36–46)
HDLC SERPL-MCNC: 54.7 MG/DL
HGB BLD-MCNC: 12 G/DL (ref 12–16)
LDLC SERPL CALC-MCNC: 188 MG/DL
MCH RBC QN AUTO: 26.9 PG (ref 26–34)
MCHC RBC AUTO-ENTMCNC: 31.6 G/DL (ref 32–36)
MCV RBC AUTO: 85 FL (ref 80–100)
MICROALBUMIN UR TEST STR-MCNC: 80 MG/L
NON HDL CHOLESTEROL: 210 MG/DL (ref 0–149)
NRBC BLD-RTO: 0 /100 WBCS (ref 0–0)
PLATELET # BLD AUTO: 218 X10*3/UL (ref 150–450)
POC FINGERSTICK BLOOD GLUCOSE: 124 MG/DL (ref 70–100)
POTASSIUM SERPL-SCNC: 3.4 MMOL/L (ref 3.5–5.3)
PROT/CREAT UR: ABNORMAL UG/MG CREAT
RBC # BLD AUTO: 4.46 X10*6/UL (ref 4–5.2)
SODIUM SERPL-SCNC: 143 MMOL/L (ref 136–145)
TRIGL SERPL-MCNC: 112 MG/DL (ref 0–149)
TSH SERPL-ACNC: 2.12 MIU/L (ref 0.44–3.98)
VLDL: 22 MG/DL (ref 0–40)
WBC # BLD AUTO: 5.6 X10*3/UL (ref 4.4–11.3)

## 2024-06-21 PROCEDURE — 80061 LIPID PANEL: CPT

## 2024-06-21 PROCEDURE — 84460 ALANINE AMINO (ALT) (SGPT): CPT

## 2024-06-21 PROCEDURE — 85027 COMPLETE CBC AUTOMATED: CPT

## 2024-06-21 PROCEDURE — 84443 ASSAY THYROID STIM HORMONE: CPT

## 2024-06-21 PROCEDURE — 84450 TRANSFERASE (AST) (SGOT): CPT

## 2024-06-21 PROCEDURE — 36415 COLL VENOUS BLD VENIPUNCTURE: CPT

## 2024-06-21 PROCEDURE — 80048 BASIC METABOLIC PNL TOTAL CA: CPT

## 2024-06-21 PROCEDURE — 83036 HEMOGLOBIN GLYCOSYLATED A1C: CPT

## 2024-06-21 RX ORDER — POTASSIUM CHLORIDE 750 MG/1
10 TABLET, FILM COATED, EXTENDED RELEASE ORAL DAILY
Qty: 30 TABLET | Refills: 6 | Status: SHIPPED | OUTPATIENT
Start: 2024-06-21 | End: 2025-01-17

## 2024-06-21 RX ORDER — ATORVASTATIN CALCIUM 40 MG/1
40 TABLET, FILM COATED ORAL NIGHTLY
Qty: 30 TABLET | Refills: 6 | Status: SHIPPED | OUTPATIENT
Start: 2024-06-21

## 2024-06-21 ASSESSMENT — ENCOUNTER SYMPTOMS
VOMITING: 0
NAUSEA: 0
MYALGIAS: 0
DIZZINESS: 0
ABDOMINAL DISTENTION: 0
DEPRESSION: 0
COLOR CHANGE: 0
JOINT SWELLING: 0
NUMBNESS: 0
ANAL BLEEDING: 0
COUGH: 0
WHEEZING: 0
FATIGUE: 0
NERVOUS/ANXIOUS: 0
CONSTIPATION: 0
DIFFICULTY URINATING: 0
TREMORS: 0
EYE PAIN: 0
POLYDIPSIA: 0
HEMATURIA: 0
FREQUENCY: 0
FEVER: 0
WEAKNESS: 0
APPETITE CHANGE: 0
ABDOMINAL PAIN: 0
SLEEP DISTURBANCE: 0
DIARRHEA: 0
EYE DISCHARGE: 0
POLYPHAGIA: 0
ARTHRALGIAS: 0
CONFUSION: 0
SORE THROAT: 0
CHOKING: 0
FACIAL SWELLING: 0
CHILLS: 0
LOSS OF SENSATION IN FEET: 0
OCCASIONAL FEELINGS OF UNSTEADINESS: 1
CHEST TIGHTNESS: 0
HEADACHES: 0
SHORTNESS OF BREATH: 0
PALPITATIONS: 0

## 2024-06-21 ASSESSMENT — PAIN SCALES - GENERAL: PAINLEVEL: 0-NO PAIN

## 2024-06-21 NOTE — PROGRESS NOTES
Subjective   Patient ID: Felecia Alejo is a 64 y.o. female with a history of hypertension, leg edema, diabetes type 2, dyslipidemia, obesity, GERD, chronic low back pain, chronic pain of both knees, s/p hysterectomy, history of alcohol abuse sober since June 1989 who presents for Follow-up.    HPI The patient is presented for follow-up.  Her depression much improved with Lexapro.  She stopped crying and her mood is stable.  She sleeps well through the night.  She often goes out with friends.  She fell three weeks ago landing on her right side.  She tripped over her loose rug. She had shoulder pain which had improved.  Her blood pressure is well-controlled.  She has not been monitoring her blood glucose for a month.  Stated last time she checked it was in the 120s. She is currently on diabetic diet.  She lost 12 pounds since on topiramate.  Today she denies shortness of breath or chest pain.  Her CT cardiac score was elevated and was referred to cardiology which is scheduled next month.    Review of Systems   Constitutional:  Negative for appetite change, chills, fatigue and fever.   HENT:  Negative for congestion, ear pain, facial swelling, hearing loss, nosebleeds and sore throat.    Eyes:  Negative for pain, discharge and visual disturbance.   Respiratory:  Negative for cough, choking, chest tightness, shortness of breath and wheezing.    Cardiovascular:  Negative for chest pain, palpitations and leg swelling.   Gastrointestinal:  Negative for abdominal distention, abdominal pain, anal bleeding, constipation, diarrhea, nausea and vomiting.   Endocrine: Negative for cold intolerance, heat intolerance, polydipsia, polyphagia and polyuria.   Genitourinary:  Negative for difficulty urinating, frequency, hematuria and urgency.   Musculoskeletal:  Negative for arthralgias, gait problem, joint swelling and myalgias.   Skin:  Negative for color change and rash.   Neurological:  Negative for dizziness, tremors, syncope,  "weakness, numbness and headaches.   Psychiatric/Behavioral:  Negative for behavioral problems, confusion, sleep disturbance and suicidal ideas. The patient is not nervous/anxious.        Objective   /78   Ht 1.575 m (5' 2\")   Wt 113 kg (250 lb)   Breastfeeding No   BMI 45.73 kg/m²     Physical Exam  Constitutional:       General: She is not in acute distress.     Appearance: Normal appearance. She is obese.   HENT:      Head: Normocephalic and atraumatic.      Nose: Nose normal.   Eyes:      Extraocular Movements: Extraocular movements intact.      Conjunctiva/sclera: Conjunctivae normal.      Pupils: Pupils are equal, round, and reactive to light.   Cardiovascular:      Rate and Rhythm: Normal rate and regular rhythm.      Pulses: Normal pulses.      Heart sounds: Normal heart sounds.   Pulmonary:      Effort: Pulmonary effort is normal.      Breath sounds: Normal breath sounds.   Abdominal:      General: Bowel sounds are normal.      Palpations: Abdomen is soft.   Musculoskeletal:         General: Normal range of motion.      Cervical back: Normal range of motion and neck supple.   Neurological:      General: No focal deficit present.      Mental Status: She is alert and oriented to person, place, and time.   Psychiatric:         Mood and Affect: Mood normal.         Behavior: Behavior normal.         Thought Content: Thought content normal.         Judgment: Judgment normal.       Assessment/Plan   Accidental fall  Right shoulder pain improving  The patient was advised to remove all loose rugs to prevent from further falls  Do not wear flip-flops     Elevated CT cardiac score  Score is 159.1  Consult cardiology, scheduled in July    Depression  Improved with Lexapro  Continue Lexapro 5 mg daily    Morbid obesity  Initial BMI 48.09 kg/m²  Today's body mass index is 45.73 kg/m².  Lost 12 pounds since on topiramate  Continue topiramate 100 mg daily  Low fat, low cholesterol diet, low carbohydrate " diet  Exercise at least 30 minutes daily    leg edema bilaterally  Started on furosemide 40 mg daily  Continue triamterene-HCTZ 37.5-25 mg daily  Continue to wear compression stockings  Continue to elevate feet  echocardiogram was done on 10/9/2023, normal.  Consult vascular surgery, not done  Cardiology scheduled in July     High blood pressure   Well-controlled  Continue furosemide 40 mg daily  Continue triamterene-HCTZ 37.5-25 mg daily  Monitor blood pressure daily  No added salt diet, do not add salt to your food  Try to exercise every other day for 30 minutes     Diabetes type II  Fingerstick today 124 mg/dL  Well controlled since on a diet  Had lost 12 pounds since on topiramate  Continue low carbohydrate diet, exercise daily  Follow up with ophthalmology as scheduled    Pulmonary nodule  Two nodules 6.7 mm and 4.5 mm in left lower lobe, accidental finding on abdominal CT scan  CT chest 10/2/23, repeat in a year     Chronic pain of both knees  Knee replacement is recommended  not a good candidate since BMI is over 42  declined bariatric surgery  Had steroidal injection with much improvement  Do not take any NSAIDs such as Aleve, ibuprofen, Motrin or naproxen  Take Tylenol extra strength as needed for pain twice daily  Apply heating pads and topical cream  Follow-up with orthopedic surgery Dr. Houston     Low back pain  stable  Core exercises  Do not take NSAIDs  Tylenol as needed for pain  Apply heating pads  Apply topical cream such as Biofreeze or Voltaren     Muscle spasms  Improved  Off of magnesium    history of alcohol abuse   sober since June 1989  Continue AA meetings daily at friends at [facility] exam  Flu vaccine declined  COVID-vaccine up-to-date  Tdap 2018  Mammogram 10/3/2023, benign  DEXA bone density 10/10/2023, normal  Shingrix, Prevnar 20 recommended to have it done at the pharmacy  Colonoscopy done on 11/23/2022, diverticulosis, repeat in 10 years  Follow-up with ophthalmology  once a year  Follow-up with dermatology once a year for skin check  Follow-up with dentist twice a year for dental cleaning    We obtained blood work  I will call with results    Follow-up in 3 months or sooner if needed

## 2024-06-27 ENCOUNTER — APPOINTMENT (OUTPATIENT)
Dept: VASCULAR SURGERY | Facility: CLINIC | Age: 64
End: 2024-06-27
Payer: MEDICARE

## 2024-07-15 ENCOUNTER — APPOINTMENT (OUTPATIENT)
Dept: PRIMARY CARE | Facility: CLINIC | Age: 64
End: 2024-07-15
Payer: MEDICARE

## 2024-07-15 VITALS
OXYGEN SATURATION: 95 % | DIASTOLIC BLOOD PRESSURE: 79 MMHG | SYSTOLIC BLOOD PRESSURE: 123 MMHG | HEIGHT: 62 IN | HEART RATE: 104 BPM | BODY MASS INDEX: 46.48 KG/M2 | WEIGHT: 252.6 LBS

## 2024-07-15 DIAGNOSIS — Z12.31 VISIT FOR SCREENING MAMMOGRAM: ICD-10-CM

## 2024-07-15 DIAGNOSIS — R73.03 PREDIABETES: ICD-10-CM

## 2024-07-15 DIAGNOSIS — E78.2 MIXED HYPERLIPIDEMIA: ICD-10-CM

## 2024-07-15 DIAGNOSIS — Z76.89 ENCOUNTER TO ESTABLISH CARE: Primary | ICD-10-CM

## 2024-07-15 DIAGNOSIS — I10 PRIMARY HYPERTENSION: ICD-10-CM

## 2024-07-15 DIAGNOSIS — Z01.00 EYE EXAM, ROUTINE: ICD-10-CM

## 2024-07-15 DIAGNOSIS — E66.01 MORBID OBESITY (MULTI): ICD-10-CM

## 2024-07-15 DIAGNOSIS — E87.6 HYPOKALEMIA: ICD-10-CM

## 2024-07-15 PROCEDURE — 3050F LDL-C >= 130 MG/DL: CPT | Performed by: STUDENT IN AN ORGANIZED HEALTH CARE EDUCATION/TRAINING PROGRAM

## 2024-07-15 PROCEDURE — 3074F SYST BP LT 130 MM HG: CPT | Performed by: STUDENT IN AN ORGANIZED HEALTH CARE EDUCATION/TRAINING PROGRAM

## 2024-07-15 PROCEDURE — 3008F BODY MASS INDEX DOCD: CPT | Performed by: STUDENT IN AN ORGANIZED HEALTH CARE EDUCATION/TRAINING PROGRAM

## 2024-07-15 PROCEDURE — 3078F DIAST BP <80 MM HG: CPT | Performed by: STUDENT IN AN ORGANIZED HEALTH CARE EDUCATION/TRAINING PROGRAM

## 2024-07-15 PROCEDURE — 99214 OFFICE O/P EST MOD 30 MIN: CPT | Performed by: STUDENT IN AN ORGANIZED HEALTH CARE EDUCATION/TRAINING PROGRAM

## 2024-07-15 PROCEDURE — 1159F MED LIST DOCD IN RCRD: CPT | Performed by: STUDENT IN AN ORGANIZED HEALTH CARE EDUCATION/TRAINING PROGRAM

## 2024-07-15 PROCEDURE — 3044F HG A1C LEVEL LT 7.0%: CPT | Performed by: STUDENT IN AN ORGANIZED HEALTH CARE EDUCATION/TRAINING PROGRAM

## 2024-07-15 PROCEDURE — 1160F RVW MEDS BY RX/DR IN RCRD: CPT | Performed by: STUDENT IN AN ORGANIZED HEALTH CARE EDUCATION/TRAINING PROGRAM

## 2024-07-15 PROCEDURE — 1036F TOBACCO NON-USER: CPT | Performed by: STUDENT IN AN ORGANIZED HEALTH CARE EDUCATION/TRAINING PROGRAM

## 2024-07-15 ASSESSMENT — ENCOUNTER SYMPTOMS
OCCASIONAL FEELINGS OF UNSTEADINESS: 0
DEPRESSION: 0
LOSS OF SENSATION IN FEET: 0

## 2024-07-15 NOTE — PROGRESS NOTES
"Subjective   Patient ID: Felecia Alejo is a 64 y.o. female who presents for New Patient Visit ([facility] care. ).        HPI      Est care   Pt's PMH, PSH, SH, FH , meds and allergies was obtained / reviewed and updated .     Prediabetes   HTN  Obesity   On topiramate for weight loss   \" Walks as much as she can \"   Delivered mail for 30 years, experienced knee pain             Visit Vitals  /79   Pulse 104   Ht 1.575 m (5' 2\")   Wt 115 kg (252 lb 9.6 oz)   SpO2 95%   BMI 46.20 kg/m²   OB Status Hysterectomy   Smoking Status Former   BSA 2.24 m²      No LMP recorded. Patient has had a hysterectomy.   Current Outpatient Medications   Medication Instructions    acetaminophen (Tylenol) 500 mg tablet TAKE 1 TABLET BY MOUTH EVERY 4 TO 6 HOURS AS NEEDED    atorvastatin (LIPITOR) 40 mg, oral, Nightly    Blood Pressure Cuff misc     blood sugar diagnostic strip Test blood glucose once daily    diclofenac sodium (Voltaren) 1 % gel gel 1 Application, Topical, 4 times daily    ergocalciferol (Vitamin D-2) 1.25 MG (88542 UT) capsule 1 capsule, oral, Weekly    lancets misc Test blood glucose once daily    meclizine (ANTIVERT) 12.5 mg, oral, 3 times daily PRN    OneTouch Delica Plus Lancet 33 gauge misc TEST BLOOD GLUCOSE ONCE DAILY    potassium chloride CR (Klor-Con) 10 mEq ER tablet 10 mEq, oral, Daily, Do not crush, chew, or split.    topiramate (TOPAMAX) 100 mg, oral, Daily    triamterene-hydrochlorothiazid (Maxzide-25) 37.5-25 mg tablet 1 tablet, oral, Daily      Social History     Tobacco Use    Smoking status: Former     Types: Cigarettes    Smokeless tobacco: Never   Substance Use Topics    Alcohol use: Not Currently     Comment: AA 35 years        Review of Systems    Constitutional : No feeling poorly / fevers/ chills / night sweats/ fatigue   Cardiovascular : No CP /Palpitations/ lower extremity edema / syncope   Respiratory : No Cough /ABAD/Dyspnea at rest   Gastrointestinal : No abd pain / N/V  No bloody " stools/ melena / constipation  Endo : No polyuria/polydipsia/ muscle weakness / sluggishness   CNS: No confusion / HA/ tingling/ numbness/ weakness of extremities  Psychiatric: No anxiety/ depression/ SI/HI    All other systems have been reviewed and are negative for complaint       Physical Exam    Constitutional : Vitals reviewed. Alert and in no distress  Cardiovascular : RRR, Normal S1, S2, No pericardial rub/ gallop, no peripheral edema   Pulmonary: No respiratory distress, CTAB   MSK : Normal gait and station , strength and tone   Skin: Warm to touch ,  normal skin turgor   Neurologic : CNs 2-12 grossly intact , no obvious FNDs  Psych : A,Ox3, normal mood and affect      Assessment/Plan   Diagnoses and all orders for this visit:  Encounter to establish care  Primary hypertension  Prediabetes  Mixed hyperlipidemia  Morbid obesity (Multi)  Hypokalemia  -     Comprehensive Metabolic Panel; Future  Eye exam, routine  -     Referral to Ophthalmology; Future  Visit for screening mammogram  -     BI mammo bilateral screening tomosynthesis; Future      64-year-old female with hypertension, hyperlipidemia, prediabetes, obesity presents to establish care.    #Hypertension: At goal.  She is taking potassium supplements for hypokalemia.  Repeat labs to monitor potassium levels.    #: Prediabetes: Stable.    #Hyperlipidemia: On statin.  Recent labs reviewed.    #Obesity: Patient on Topamax 100 mg once daily reports also making changes with lifestyle.  Fears for safety in her neighborhood hands does not get out too often for walking.   does have exercise equipment at home.  Return to the office in 3 to 4 months.            Conditions addressed and mgmt as noted above.  Pertinent labs, images/ imaging reports , chart review was done .   Age appropriate labs / labs for mgmt of chronic medical conditions ordered, further mgmt pending the results.

## 2024-07-18 DIAGNOSIS — E11.9 TYPE 2 DIABETES MELLITUS WITHOUT COMPLICATION, WITHOUT LONG-TERM CURRENT USE OF INSULIN (MULTI): ICD-10-CM

## 2024-07-18 RX ORDER — BLOOD SUGAR DIAGNOSTIC
STRIP MISCELLANEOUS
Qty: 100 STRIP | Refills: 3 | Status: SHIPPED | OUTPATIENT
Start: 2024-07-18

## 2024-07-26 ENCOUNTER — OFFICE VISIT (OUTPATIENT)
Dept: CARDIOLOGY | Facility: HOSPITAL | Age: 64
End: 2024-07-26
Payer: MEDICARE

## 2024-07-26 VITALS
OXYGEN SATURATION: 99 % | WEIGHT: 249.5 LBS | SYSTOLIC BLOOD PRESSURE: 113 MMHG | HEIGHT: 63 IN | BODY MASS INDEX: 44.21 KG/M2 | HEART RATE: 71 BPM | DIASTOLIC BLOOD PRESSURE: 76 MMHG

## 2024-07-26 DIAGNOSIS — R93.1 ELEVATED CORONARY ARTERY CALCIUM SCORE: Primary | ICD-10-CM

## 2024-07-26 DIAGNOSIS — I10 HTN (HYPERTENSION): ICD-10-CM

## 2024-07-26 DIAGNOSIS — E66.9 OBESITY: ICD-10-CM

## 2024-07-26 LAB
ATRIAL RATE: 66 BPM
P AXIS: 27 DEGREES
P OFFSET: 179 MS
P ONSET: 125 MS
PR INTERVAL: 200 MS
Q ONSET: 225 MS
QRS COUNT: 11 BEATS
QRS DURATION: 74 MS
QT INTERVAL: 418 MS
QTC CALCULATION(BAZETT): 438 MS
QTC FREDERICIA: 431 MS
R AXIS: -13 DEGREES
T AXIS: 31 DEGREES
T OFFSET: 434 MS
VENTRICULAR RATE: 66 BPM

## 2024-07-26 PROCEDURE — 99214 OFFICE O/P EST MOD 30 MIN: CPT | Performed by: HOSPITALIST

## 2024-07-26 PROCEDURE — 93005 ELECTROCARDIOGRAM TRACING: CPT | Performed by: HOSPITALIST

## 2024-07-26 PROCEDURE — 3044F HG A1C LEVEL LT 7.0%: CPT | Performed by: HOSPITALIST

## 2024-07-26 PROCEDURE — 1160F RVW MEDS BY RX/DR IN RCRD: CPT | Performed by: HOSPITALIST

## 2024-07-26 PROCEDURE — 3074F SYST BP LT 130 MM HG: CPT | Performed by: HOSPITALIST

## 2024-07-26 PROCEDURE — 1159F MED LIST DOCD IN RCRD: CPT | Performed by: HOSPITALIST

## 2024-07-26 PROCEDURE — 3008F BODY MASS INDEX DOCD: CPT | Performed by: HOSPITALIST

## 2024-07-26 PROCEDURE — 3050F LDL-C >= 130 MG/DL: CPT | Performed by: HOSPITALIST

## 2024-07-26 PROCEDURE — 1036F TOBACCO NON-USER: CPT | Performed by: HOSPITALIST

## 2024-07-26 PROCEDURE — 3078F DIAST BP <80 MM HG: CPT | Performed by: HOSPITALIST

## 2024-07-26 ASSESSMENT — COLUMBIA-SUICIDE SEVERITY RATING SCALE - C-SSRS
6. HAVE YOU EVER DONE ANYTHING, STARTED TO DO ANYTHING, OR PREPARED TO DO ANYTHING TO END YOUR LIFE?: NO
1. IN THE PAST MONTH, HAVE YOU WISHED YOU WERE DEAD OR WISHED YOU COULD GO TO SLEEP AND NOT WAKE UP?: NO
2. HAVE YOU ACTUALLY HAD ANY THOUGHTS OF KILLING YOURSELF?: NO

## 2024-07-26 NOTE — PATIENT INSTRUCTIONS
Thank you so much for visiting us today.    Please do your best to exercise a regular basis, please add aerobic cardio exercises, start at 5 to 10 minutes a day, ideally 30 minutes 5 times a week.    Please do your best to lose weight through diet and exercise.  Look up Mediterranean diet.  Skip breakfast if you can,  a window of 10 hours to eat a day [10 AM to 6 PM for example], do not eat after 6 PM at night.  Avoid food rich with sugar and carbohydrates like bread, pasta, rice, and potatoes.  Count your calories and try to be 500-calorie deficient a day, this way would lose 1 pound a week [3500 sara equals to 1 pound].    Continue atorvastatin.    If you start to develop chest pain/chest pressure/tightness with exertion or worsening short of breath, please contact us at [phone]    Please talk to your primary care physician about your family history of brain aneurysms.

## 2024-07-26 NOTE — PROGRESS NOTES
Subjective   Felecia Alejo is a 64 y.o. female with PMH of HTN, HLD, diabetes, morbid obesity, chronic venous stasis, pulmonary nodules, GERD, PIPPA, and other comorbidities, who is here for evaluation of elevated coronary calcium score.  Patient is not very physically active due to left knee pain.  She climbs 15 steps stairs at her house multiple times a day, she complains of ABAD 2 out of 10 times.  She denies any chest pressure, chest tightness, orthopnea, dizziness, or palpitation.  Blood pressure today is 130/76, with a heart of 71. Patient is on atorvastatin 40 mg, and triamterene-HCTZ 37.5-25 g once daily.  No family history of premature heart attack.  Patient never smoked since her teenager years.    EKG from 7/26/2024: Normal sinus rhythm, borderline SC interval at 200 ms, otherwise normal EKG.    CT coronary 6/13/2024:  LM 0, .5, LCx 2.6, RCA 0, Total 159.1    TTE from 10/9/2023:  1. Left ventricular systolic function is normal.  2. There are no prior studies on this patient for comparison purposes.      Review of Systems  ROS is negative other than in HPI.      Objective   Physical Exam  General: Obese, NAD  HEENT: IEOM, PERRL   Neck: No JVD or carotid bruit  Lungs: CTAB  Heart: RRR, normal S1 and S2, no loud murmurs  Abdomen: Soft, nontender, positive bowel sounds  Extremities: No edema  Neurologic: No FND  Psychiatric: Normal mood and affect    Assessment/Plan   1-elevated coronary calcium score:  -Of 159, mostly in the LAD.  -EKG with no ischemic changes, NSR and unremarkable overall.  -Patient's physical activity is limited due to knee pain and body habitus, she reports seldom ABAD climbing 1 flight of stairs which can be explained by her body habitus.  No chest pain or pressure.  -No further cardiac testing is indicated.  -Patient was counseled about the importance of losing weight through healthy diet and exercise.  -Continue atorvastatin 40 mg once daily.    2-HTN:  -Controlled, continue  current meds.    3-family history of brain aneurysms:  -Both mother and father had brain aneurysm per patient report.  Patient was advised discuss this with her PCP.     RTC as needed    Cleopatra Fernandez MD

## 2024-08-08 ENCOUNTER — OFFICE VISIT (OUTPATIENT)
Dept: ORTHOPEDIC SURGERY | Facility: CLINIC | Age: 64
End: 2024-08-08
Payer: MEDICARE

## 2024-08-08 VITALS — BODY MASS INDEX: 44.12 KG/M2 | WEIGHT: 249 LBS | HEIGHT: 63 IN

## 2024-08-08 DIAGNOSIS — M17.0 PRIMARY OSTEOARTHRITIS OF BOTH KNEES: Primary | ICD-10-CM

## 2024-08-08 PROCEDURE — 2500000004 HC RX 250 GENERAL PHARMACY W/ HCPCS (ALT 636 FOR OP/ED): Performed by: ORTHOPAEDIC SURGERY

## 2024-08-08 PROCEDURE — 20610 DRAIN/INJ JOINT/BURSA W/O US: CPT | Mod: 50 | Performed by: ORTHOPAEDIC SURGERY

## 2024-08-08 PROCEDURE — 1125F AMNT PAIN NOTED PAIN PRSNT: CPT | Performed by: ORTHOPAEDIC SURGERY

## 2024-08-08 PROCEDURE — 2500000005 HC RX 250 GENERAL PHARMACY W/O HCPCS: Performed by: ORTHOPAEDIC SURGERY

## 2024-08-08 PROCEDURE — 1036F TOBACCO NON-USER: CPT | Performed by: ORTHOPAEDIC SURGERY

## 2024-08-08 PROCEDURE — 3050F LDL-C >= 130 MG/DL: CPT | Performed by: ORTHOPAEDIC SURGERY

## 2024-08-08 PROCEDURE — 3044F HG A1C LEVEL LT 7.0%: CPT | Performed by: ORTHOPAEDIC SURGERY

## 2024-08-08 PROCEDURE — 1159F MED LIST DOCD IN RCRD: CPT | Performed by: ORTHOPAEDIC SURGERY

## 2024-08-08 PROCEDURE — 3008F BODY MASS INDEX DOCD: CPT | Performed by: ORTHOPAEDIC SURGERY

## 2024-08-08 RX ORDER — LIDOCAINE HYDROCHLORIDE 10 MG/ML
4 INJECTION INFILTRATION; PERINEURAL
Status: COMPLETED | OUTPATIENT
Start: 2024-08-08 | End: 2024-08-08

## 2024-08-08 RX ORDER — TRIAMCINOLONE ACETONIDE 40 MG/ML
1 INJECTION, SUSPENSION INTRA-ARTICULAR; INTRAMUSCULAR
Status: COMPLETED | OUTPATIENT
Start: 2024-08-08 | End: 2024-08-08

## 2024-08-08 ASSESSMENT — PAIN SCALES - GENERAL: PAINLEVEL_OUTOF10: 7

## 2024-08-08 ASSESSMENT — PAIN - FUNCTIONAL ASSESSMENT: PAIN_FUNCTIONAL_ASSESSMENT: 0-10

## 2024-08-08 NOTE — PROGRESS NOTES
L Inj/Asp: bilateral knee on 8/8/2024 10:27 AM  Indications: pain and joint swelling  Details: 22 G needle, anterolateral approach  Medications (Right): 1 mL triamcinolone acetonide 40 mg/mL; 4 mL lidocaine 10 mg/mL (1 %)  Medications (Left): 1 mL triamcinolone acetonide 40 mg/mL; 4 mL lidocaine 10 mg/mL (1 %)  Outcome: tolerated well, no immediate complications    Discussion:  I discussed the conservative treatment options for knee osteoarthritis including but not limited to physical therapy, oral NSAIDS, activity and lifestyle modification, and corticosteroid injections. Pt has elected to undergo a cortisone injection today. I have explained the risk and benefits of an injection including the possibility of joint infection, bleeding, damage to cartilage, allergic reaction. Patient verbalized understanding and gave verbal consent wishes to proceed with a intra-articular cortisone injection for their knee.    Procedure:  After discussing the risk and benefits of the procedure, we proceeded with an intra-articular bilateral knee injection.    With the patient's informed verbal consent, the bilateral knees were prepped in standard sterile fashion with Chlorhexidine. The skin was then anesthetized with ethyl chloride spray and cleaned again with Chlorhexidine. The bilateral knees were then apirated/injected with a prefilled 20-gauge syringe of 40 mg Kenalog + 4 ml Lidocaine using the lateral approach without complications.  The patient tolerated this well and felt immediate initial relief of symptoms. A bandaid was applied and the patient ambulated out of the clinic on ther own accord without difficulty. Patient was instructed to avoid physical activity for 24-48 hours to prevent the knees from swelling and may ice the knees as tolerated. Patient should contact the office if any signs of of infection appear: redness, fever, chills, drainage, swelling or warmth to the knees.  Pt understands that the injections can be  repeated no sooner than 3 months.      Procedure, treatment alternatives, risks and benefits explained, specific risks discussed. Consent was given by the patient. Immediately prior to procedure a time out was called to verify the correct patient, procedure, equipment, support staff and site/side marked as required. Patient was prepped and draped in the usual sterile fashion.

## 2024-08-13 ENCOUNTER — APPOINTMENT (OUTPATIENT)
Dept: CARDIOLOGY | Facility: CLINIC | Age: 64
End: 2024-08-13
Payer: MEDICARE

## 2024-08-29 DIAGNOSIS — E66.01 CLASS 3 SEVERE OBESITY DUE TO EXCESS CALORIES WITHOUT SERIOUS COMORBIDITY WITH BODY MASS INDEX (BMI) OF 45.0 TO 49.9 IN ADULT (MULTI): ICD-10-CM

## 2024-08-29 RX ORDER — TOPIRAMATE 100 MG/1
100 TABLET, FILM COATED ORAL DAILY
Qty: 90 TABLET | Refills: 1 | Status: SHIPPED | OUTPATIENT
Start: 2024-08-29

## 2024-09-30 ENCOUNTER — LAB (OUTPATIENT)
Dept: LAB | Facility: LAB | Age: 64
End: 2024-09-30
Payer: MEDICARE

## 2024-09-30 ENCOUNTER — APPOINTMENT (OUTPATIENT)
Dept: PRIMARY CARE | Facility: CLINIC | Age: 64
End: 2024-09-30
Payer: MEDICARE

## 2024-09-30 VITALS
DIASTOLIC BLOOD PRESSURE: 80 MMHG | BODY MASS INDEX: 44.92 KG/M2 | SYSTOLIC BLOOD PRESSURE: 138 MMHG | TEMPERATURE: 98.4 F | HEART RATE: 92 BPM | WEIGHT: 253.6 LBS

## 2024-09-30 DIAGNOSIS — E78.2 MIXED HYPERLIPIDEMIA: ICD-10-CM

## 2024-09-30 DIAGNOSIS — I10 BENIGN ESSENTIAL HYPERTENSION: ICD-10-CM

## 2024-09-30 DIAGNOSIS — R73.03 PREDIABETES: ICD-10-CM

## 2024-09-30 DIAGNOSIS — R60.9 DEPENDENT EDEMA: Primary | ICD-10-CM

## 2024-09-30 LAB
ANION GAP SERPL CALC-SCNC: 11 MMOL/L (ref 10–20)
BUN SERPL-MCNC: 16 MG/DL (ref 6–23)
CALCIUM SERPL-MCNC: 8.6 MG/DL (ref 8.6–10.3)
CHLORIDE SERPL-SCNC: 108 MMOL/L (ref 98–107)
CHOLEST SERPL-MCNC: 184 MG/DL (ref 0–199)
CHOLESTEROL/HDL RATIO: 3.2
CO2 SERPL-SCNC: 28 MMOL/L (ref 21–32)
CREAT SERPL-MCNC: 0.84 MG/DL (ref 0.5–1.05)
EGFRCR SERPLBLD CKD-EPI 2021: 77 ML/MIN/1.73M*2
EST. AVERAGE GLUCOSE BLD GHB EST-MCNC: 134 MG/DL
GLUCOSE SERPL-MCNC: 114 MG/DL (ref 74–99)
HBA1C MFR BLD: 6.3 %
HDLC SERPL-MCNC: 56.8 MG/DL
LDLC SERPL CALC-MCNC: 109 MG/DL
NON HDL CHOLESTEROL: 127 MG/DL (ref 0–149)
POTASSIUM SERPL-SCNC: 3.7 MMOL/L (ref 3.5–5.3)
SODIUM SERPL-SCNC: 143 MMOL/L (ref 136–145)
TRIGL SERPL-MCNC: 90 MG/DL (ref 0–149)
VLDL: 18 MG/DL (ref 0–40)

## 2024-09-30 PROCEDURE — 3075F SYST BP GE 130 - 139MM HG: CPT | Performed by: STUDENT IN AN ORGANIZED HEALTH CARE EDUCATION/TRAINING PROGRAM

## 2024-09-30 PROCEDURE — 83036 HEMOGLOBIN GLYCOSYLATED A1C: CPT

## 2024-09-30 PROCEDURE — 99214 OFFICE O/P EST MOD 30 MIN: CPT | Performed by: STUDENT IN AN ORGANIZED HEALTH CARE EDUCATION/TRAINING PROGRAM

## 2024-09-30 PROCEDURE — 1036F TOBACCO NON-USER: CPT | Performed by: STUDENT IN AN ORGANIZED HEALTH CARE EDUCATION/TRAINING PROGRAM

## 2024-09-30 PROCEDURE — 3079F DIAST BP 80-89 MM HG: CPT | Performed by: STUDENT IN AN ORGANIZED HEALTH CARE EDUCATION/TRAINING PROGRAM

## 2024-09-30 PROCEDURE — 1159F MED LIST DOCD IN RCRD: CPT | Performed by: STUDENT IN AN ORGANIZED HEALTH CARE EDUCATION/TRAINING PROGRAM

## 2024-09-30 PROCEDURE — 36415 COLL VENOUS BLD VENIPUNCTURE: CPT

## 2024-09-30 PROCEDURE — 3044F HG A1C LEVEL LT 7.0%: CPT | Performed by: STUDENT IN AN ORGANIZED HEALTH CARE EDUCATION/TRAINING PROGRAM

## 2024-09-30 PROCEDURE — 80048 BASIC METABOLIC PNL TOTAL CA: CPT

## 2024-09-30 PROCEDURE — 1160F RVW MEDS BY RX/DR IN RCRD: CPT | Performed by: STUDENT IN AN ORGANIZED HEALTH CARE EDUCATION/TRAINING PROGRAM

## 2024-09-30 PROCEDURE — 3049F LDL-C 100-129 MG/DL: CPT | Performed by: STUDENT IN AN ORGANIZED HEALTH CARE EDUCATION/TRAINING PROGRAM

## 2024-09-30 PROCEDURE — 80061 LIPID PANEL: CPT

## 2024-09-30 RX ORDER — FUROSEMIDE 20 MG/1
TABLET ORAL
Qty: 45 TABLET | Refills: 1 | Status: SHIPPED | OUTPATIENT
Start: 2024-09-30

## 2024-09-30 NOTE — PROGRESS NOTES
Subjective   Patient ID: Felecia Alejo is a 64 y.o. female who presents for Foot Pain.        HPI    64-year-old female with hypertension, hyperlipidemia, prediabetes, obesity     B/l feet swelling  X 2 years   On diuretic  , takes at night   Retired   No dedicated physical activity   Volunteers at assisted living         Visit Vitals  /80 (BP Location: Left arm, Patient Position: Sitting, BP Cuff Size: Adult)   Pulse 92   Temp 36.9 °C (98.4 °F) (Oral)   Wt 115 kg (253 lb 9.6 oz)   BMI 44.92 kg/m²   OB Status Hysterectomy   Smoking Status Former   BSA 2.26 m²      No LMP recorded. Patient has had a hysterectomy.   Current Outpatient Medications   Medication Instructions    acetaminophen (Tylenol) 500 mg tablet TAKE 1 TABLET BY MOUTH EVERY 4 TO 6 HOURS AS NEEDED    atorvastatin (LIPITOR) 40 mg, oral, Nightly    Blood Pressure Cuff misc     blood sugar diagnostic (OneTouch Ultra Test) strip TEST BLOOD GLUCOSE ONCE DAILY    diclofenac sodium (Voltaren) 1 % gel gel 1 Application, Topical, 4 times daily    ergocalciferol (Vitamin D-2) 1.25 MG (13089 UT) capsule 1 capsule, oral, Weekly    furosemide (Lasix) 20 mg tablet Take 1 tablet by mouth every other day. On the days that you take this medication , take half a pill of the triamterene - hydrochlorothiazide    lancets misc Test blood glucose once daily    meclizine (ANTIVERT) 12.5 mg, oral, 3 times daily PRN    OneTouch Delica Plus Lancet 33 gauge misc TEST BLOOD GLUCOSE ONCE DAILY    potassium chloride CR (Klor-Con) 10 mEq ER tablet 10 mEq, oral, Daily, Do not crush, chew, or split.    topiramate (TOPAMAX) 100 mg, oral, Daily    triamterene-hydrochlorothiazid (Maxzide-25) 37.5-25 mg tablet 1 tablet, oral, Daily      Social History     Tobacco Use    Smoking status: Former     Types: Cigarettes    Smokeless tobacco: Never   Substance Use Topics    Alcohol use: Not Currently     Comment: AA 35 years        Review of Systems    Constitutional : No feeling poorly /  fevers/ chills / night sweats/ fatigue   Cardiovascular : No CP /Palpitations/ lower extremity edema / syncope   Respiratory : No Cough /ABAD/Dyspnea at rest   Gastrointestinal : No abd pain / N/V  No bloody stools/ melena / constipation  Endo : No polyuria/polydipsia/ muscle weakness / sluggishness   CNS: No confusion / HA/ tingling/ numbness/ weakness of extremities  Psychiatric: No anxiety/ depression/ SI/HI    All other systems have been reviewed and are negative for complaint       Physical Exam    Constitutional : Vitals reviewed. Alert and in no distress  Cardiovascular : RRR, Normal S1, S2, No pericardial rub/ gallop, no peripheral edema   Pulmonary: No respiratory distress, CTAB   MSK : Normal gait and station , strength and tone   Skin: Warm to touch ,  normal skin turgor   Neurologic : CNs 2-12 grossly intact , no obvious FNDs  Psych : A,Ox3, normal mood and affect      Assessment/Plan   Diagnoses and all orders for this visit:  Mixed hyperlipidemia  -     Lipid Panel; Future  Benign essential hypertension  -     Basic Metabolic Panel; Future  -     Basic Metabolic Panel; Future  Prediabetes  -     Hemoglobin A1C; Future  Dependent edema  -     furosemide (Lasix) 20 mg tablet; Take 1 tablet by mouth every other day. On the days that you take this medication , take half a pill of the triamterene - hydrochlorothiazide  64-year-old female with hypertension, hyperlipidemia, prediabetes, obesity     Dependent edema   Knee high compression stockings   Foot end elevation   Salt restriction   Increased movement  Weight loss   Start Lasix 20mg every other day , take 1/2 of maxzide on days of Lasix    RTO in dec , rpt BMP prior to the visit               Conditions addressed and mgmt as noted above.  Pertinent labs, images/ imaging reports , chart review was done .   Age appropriate labs / labs for mgmt of chronic medical conditions ordered, further mgmt pending the results.       This note is intended for the  physician writing it, as well as to communicate findings to other healthcare professionals. These notes use medical lexicon that may be misunderstood by non medical persons. Therefore, interpretations of medical notes and terminology should be approached with caution.

## 2024-10-03 ENCOUNTER — HOSPITAL ENCOUNTER (OUTPATIENT)
Dept: RADIOLOGY | Facility: CLINIC | Age: 64
Discharge: HOME | End: 2024-10-03
Payer: MEDICARE

## 2024-10-03 VITALS — WEIGHT: 253 LBS | HEIGHT: 63 IN | BODY MASS INDEX: 44.83 KG/M2

## 2024-10-03 DIAGNOSIS — Z12.31 VISIT FOR SCREENING MAMMOGRAM: ICD-10-CM

## 2024-10-03 PROCEDURE — 77067 SCR MAMMO BI INCL CAD: CPT

## 2024-10-14 ENCOUNTER — OFFICE VISIT (OUTPATIENT)
Dept: CARDIOLOGY | Facility: CLINIC | Age: 64
End: 2024-10-14
Payer: MEDICARE

## 2024-10-14 VITALS
OXYGEN SATURATION: 98 % | HEIGHT: 63 IN | WEIGHT: 247.9 LBS | SYSTOLIC BLOOD PRESSURE: 113 MMHG | HEART RATE: 80 BPM | DIASTOLIC BLOOD PRESSURE: 78 MMHG | BODY MASS INDEX: 43.93 KG/M2

## 2024-10-14 DIAGNOSIS — R93.1 ABNORMAL HEART SCORE CT: ICD-10-CM

## 2024-10-14 DIAGNOSIS — I10 ESSENTIAL HYPERTENSION: ICD-10-CM

## 2024-10-14 DIAGNOSIS — E78.5 HYPERLIPIDEMIA, UNSPECIFIED HYPERLIPIDEMIA TYPE: Primary | ICD-10-CM

## 2024-10-14 PROCEDURE — 3008F BODY MASS INDEX DOCD: CPT | Performed by: STUDENT IN AN ORGANIZED HEALTH CARE EDUCATION/TRAINING PROGRAM

## 2024-10-14 PROCEDURE — 3074F SYST BP LT 130 MM HG: CPT | Performed by: STUDENT IN AN ORGANIZED HEALTH CARE EDUCATION/TRAINING PROGRAM

## 2024-10-14 PROCEDURE — 1126F AMNT PAIN NOTED NONE PRSNT: CPT | Performed by: STUDENT IN AN ORGANIZED HEALTH CARE EDUCATION/TRAINING PROGRAM

## 2024-10-14 PROCEDURE — 3049F LDL-C 100-129 MG/DL: CPT | Performed by: STUDENT IN AN ORGANIZED HEALTH CARE EDUCATION/TRAINING PROGRAM

## 2024-10-14 PROCEDURE — 1036F TOBACCO NON-USER: CPT | Performed by: STUDENT IN AN ORGANIZED HEALTH CARE EDUCATION/TRAINING PROGRAM

## 2024-10-14 PROCEDURE — 99204 OFFICE O/P NEW MOD 45 MIN: CPT | Performed by: STUDENT IN AN ORGANIZED HEALTH CARE EDUCATION/TRAINING PROGRAM

## 2024-10-14 PROCEDURE — 3044F HG A1C LEVEL LT 7.0%: CPT | Performed by: STUDENT IN AN ORGANIZED HEALTH CARE EDUCATION/TRAINING PROGRAM

## 2024-10-14 PROCEDURE — 1160F RVW MEDS BY RX/DR IN RCRD: CPT | Performed by: STUDENT IN AN ORGANIZED HEALTH CARE EDUCATION/TRAINING PROGRAM

## 2024-10-14 PROCEDURE — 3078F DIAST BP <80 MM HG: CPT | Performed by: STUDENT IN AN ORGANIZED HEALTH CARE EDUCATION/TRAINING PROGRAM

## 2024-10-14 PROCEDURE — 1159F MED LIST DOCD IN RCRD: CPT | Performed by: STUDENT IN AN ORGANIZED HEALTH CARE EDUCATION/TRAINING PROGRAM

## 2024-10-14 PROCEDURE — 99214 OFFICE O/P EST MOD 30 MIN: CPT | Performed by: STUDENT IN AN ORGANIZED HEALTH CARE EDUCATION/TRAINING PROGRAM

## 2024-10-14 RX ORDER — ATORVASTATIN CALCIUM 80 MG/1
80 TABLET, FILM COATED ORAL DAILY
Qty: 90 TABLET | Refills: 3 | Status: SHIPPED | OUTPATIENT
Start: 2024-10-14 | End: 2025-10-14

## 2024-10-14 RX ORDER — NAPROXEN SODIUM 220 MG/1
81 TABLET, FILM COATED ORAL DAILY
Qty: 90 TABLET | Refills: 3 | Status: SHIPPED | OUTPATIENT
Start: 2024-10-14 | End: 2025-10-14

## 2024-10-14 ASSESSMENT — COLUMBIA-SUICIDE SEVERITY RATING SCALE - C-SSRS
1. IN THE PAST MONTH, HAVE YOU WISHED YOU WERE DEAD OR WISHED YOU COULD GO TO SLEEP AND NOT WAKE UP?: NO
6. HAVE YOU EVER DONE ANYTHING, STARTED TO DO ANYTHING, OR PREPARED TO DO ANYTHING TO END YOUR LIFE?: NO
2. HAVE YOU ACTUALLY HAD ANY THOUGHTS OF KILLING YOURSELF?: NO

## 2024-10-14 ASSESSMENT — PAIN SCALES - GENERAL: PAINLEVEL: 0-NO PAIN

## 2024-10-14 ASSESSMENT — PATIENT HEALTH QUESTIONNAIRE - PHQ9
2. FEELING DOWN, DEPRESSED OR HOPELESS: NOT AT ALL
SUM OF ALL RESPONSES TO PHQ9 QUESTIONS 1 AND 2: 0
1. LITTLE INTEREST OR PLEASURE IN DOING THINGS: NOT AT ALL

## 2024-10-14 ASSESSMENT — ENCOUNTER SYMPTOMS
DEPRESSION: 0
OCCASIONAL FEELINGS OF UNSTEADINESS: 0
LOSS OF SENSATION IN FEET: 0

## 2024-10-14 NOTE — PATIENT INSTRUCTIONS
Dear Felecia Alejo,    It was a pleasure meeting you today at the Cardiology office. As we dicussed, your clinical condition is high cholesterol and coronary calcifications. I recommended increasing the atorvastatin to 80 mg every day and recheck your LDL in 3 months. Also, you should start low dose aspirin as primary prevention as your bleeding risk is low. Please follow up with your PCP once once your results are available.    Sincerely,     Damián Nickerson MD

## 2024-10-14 NOTE — PROGRESS NOTES
Location of visit: [facility] [address] [place] [address]   Type of Visit: New    Chief Complaint:  Patient was referred to Cardiology by Dr. Bella for coronary calcium.    History Of Present Illness:    Felecia Alejo is a 64 y.o. female, with history significant for HTN, HLD, venous insufficiency, prediabetes, GERD, depression, PIPPA, prior smoking, lung nodule, and BMI 44, who visits Cardiology today as a new patient for elevated calcium score (159 prox LAD). Transthoracic echocardiogram 10/2023 was significant for normal LV function and slightly elevated RVSP. Blood pressure appears well controlled by previous measurements.     Patient denies chest pain, dyspnea on exertion, shortness of breath, orthopnea, PND, nocturia, palpitations, dizziness, lightheadedness, syncope, claudication. She was studies for PIPPA and was reportedly negative in 2022.    Blood pressure: 107/72 mmHg  HR: 80 bpm    Last  on moderate Lipitor dose dropped from 188.     Today's ECG shows sinus rhythm at 80 bpm, normal AV conduction, and normal ventricular repolarization. QS in D3 and aVF.    Past Medical History:  She has a past medical history of Hyperlipemia and Pre-diabetes.    Past Surgical History:  She has a past surgical history that includes Hysterectomy and biopsy (2020).    Social History:  She reports that she has quit smoking. Her smoking use included cigarettes. She has never used smokeless tobacco. She reports that she does not currently use alcohol. She reports that she does not currently use drugs.    Family History:  Family History   Problem Relation Name Age of Onset    Cerebral aneurysm Mother      Cerebral aneurysm Father      PTSD Father      Heart attack Sister      Hypertension Sister      Diabetes Sister      Cancer Sister          unknown origin    Breast cancer Sister      Coronary artery disease Sister      Alzheimer's disease Brother      Breast cancer Maternal Cousin       Allergies:  Duloxetine hcl and  "Etodolac    Outpatient Medications:  Current Outpatient Medications   Medication Instructions    acetaminophen (Tylenol) 500 mg tablet TAKE 1 TABLET BY MOUTH EVERY 4 TO 6 HOURS AS NEEDED    atorvastatin (LIPITOR) 40 mg, oral, Nightly    Blood Pressure Cuff misc     blood sugar diagnostic (OneTouch Ultra Test) strip TEST BLOOD GLUCOSE ONCE DAILY    diclofenac sodium (Voltaren) 1 % gel gel 1 Application, Topical, 4 times daily    furosemide (Lasix) 20 mg tablet Take 1 tablet by mouth every other day. On the days that you take this medication , take half a pill of the triamterene - hydrochlorothiazide    lancets misc Test blood glucose once daily    meclizine (ANTIVERT) 12.5 mg, oral, 3 times daily PRN    OneTouch Delica Plus Lancet 33 gauge misc TEST BLOOD GLUCOSE ONCE DAILY    potassium chloride CR (Klor-Con) 10 mEq ER tablet 10 mEq, oral, Daily, Do not crush, chew, or split.    topiramate (TOPAMAX) 100 mg, oral, Daily    triamterene-hydrochlorothiazid (Maxzide-25) 37.5-25 mg tablet 1 tablet, oral, Daily     Last Recorded Vitals:  Vitals:    10/14/24 0907 10/14/24 0916   BP: 107/72 113/78   BP Location: Left arm Right arm   Patient Position: Sitting Sitting   BP Cuff Size: Large adult Large adult   Pulse: 80    SpO2: 98%    Weight: 112 kg (247 lb 14.4 oz)    Height: 1.6 m (5' 3\")      Physical Exam:      10/14/2024     9:16 AM 10/14/2024     9:07 AM 10/3/2024     9:00 AM 9/30/2024    10:15 AM 8/8/2024    10:08 AM 7/26/2024     9:04 AM   Vitals   Systolic 113 107  138  113   Diastolic 78 72  80  76   Heart Rate  80  92  71   Temp    36.9 °C (98.4 °F)     Height (in)  1.6 m (5' 3\") 1.6 m (5' 2.99\")  1.6 m (5' 3\") 1.6 m (5' 3\")   Weight (lb)  247.9 253 253.6 249 249.5   BMI  43.91 kg/m2 44.83 kg/m2 44.92 kg/m2 44.11 kg/m2 44.2 kg/m2   BSA (m2)  2.23 m2 2.26 m2 2.26 m2 2.24 m2 2.24 m2   Visit Report Report Report  Report Report Report     Wt Readings from Last 5 Encounters:   10/14/24 112 kg (247 lb 14.4 oz)   10/03/24 " 115 kg (253 lb)   09/30/24 115 kg (253 lb 9.6 oz)   08/08/24 113 kg (249 lb)   07/26/24 113 kg (249 lb 8 oz)     General: Sitting up comfortably in chair; in no apparent distress.  HEENT: Normocephalic; atraumatic. Well hydrated.  Eyes: Anicteric sclera. Extraocular movement intact.  Neck: Supple; no thyromegaly; normal jugular venous pressure, no bruits.  Respiratory: Bilateral air entry equal. No wheezing.  Cardiovascular: Normal S1, S2; no murmurs auscultated.  Abdomen: Nondistended; nontender. (+) bowel sounds.  Extremities: No peripheral edema present. Pulses 2+ diffusely.  Neurological: Oriented to time, place, and person; nonfocal.  Psychiatric: Normal affect.     Last Labs Reviewed:  CBC -  Recent Labs     06/21/24  1100 03/04/22  1330   WBC 5.6 4.8   HGB 12.0 12.1   HCT 38.0 38.8    223   MCV 85 86     CMP -  Recent Labs     09/30/24  1057 06/21/24  1100 03/04/22  1330    143 140   K 3.7 3.4* 3.7   * 107 101   CO2 28 27 31   ANIONGAP 11 12 12   BUN 16 16 19   CREATININE 0.84 0.97 0.89   EGFR 77 65  --    CALCIUM 8.6 9.4 9.3     Recent Labs     06/21/24  1100 03/04/22  1330   ALBUMIN  --  3.8   ALKPHOS  --  70   ALT 12 13   AST 13 15   BILITOT  --  0.4     LIPID PANEL -   Recent Labs     09/30/24  1057 06/21/24  1100 03/04/22  1330   CHOL 184 265* 251*   LDLF  --   --  159*   LDLCALC 109* 188*  --    HDL 56.8 54.7 66.8   TRIG 90 112 124     HEME/ENDO -  Recent Labs     09/30/24  1057 06/21/24  1100 03/04/22  1330 05/14/20  1144   TSH  --  2.12 1.84  --    HGBA1C 6.3* 6.2* 5.9* 6.4*     Last Cardiology/Imaging Tests Personally Reviewed (if images available) and Interpreted:  ECG:  Encounter Date: 07/26/24   ECG 12 lead (Clinic Performed)   Result Value    Ventricular Rate 66    Atrial Rate 66    OR Interval 200    QRS Duration 74    QT Interval 418    QTC Calculation(Bazett) 438    P Axis 27    R Axis -13    T Axis 31    QRS Count 11    Q Onset 225    P Onset 125    P Offset 179    T Offset  434    QTC Fredericia 431    Narrative    Normal sinus rhythm with sinus arrhythmia  Septal infarct , age undetermined     Echocardiogram:  Transthoracic Echo (TTE) Complete; 10/09/2023   1. Left ventricular systolic function is normal.   2. Spectral Doppler shows a normal pattern of left ventricular diastolic filling.    3. Slightly elevated RVSP.    Cath:  No results found.    Stress Test:  No results found.    Cardiac CT/MRI:  CT cardiac scoring wo IV contrast; 06/13/2024  FINDINGS:  The score and distribution of calcium in the coronary arteries is as follows:  LM 0,  .5,  LCx 2.6,  RCA 0,  Total 159.1    The visualized ascending thoracic aorta measures 3.4 cm in diameter. The heart is normal in size. No pericardial effusion is present. Focal atherosclerosis of the descending thoracic aorta.    CV RISK FACTORS:   # Hypertension: Last BP: 113/78.  # Hyperlipidemia: Last Tchol 184 / LDL No results found for requested labs within last 365 days. / HDL 56.8 / TRIG 90 (9/30/2024: 10:57 AM).  # Type II Diabetes Mellitus: Last A1c 6.3 (9/30/2024: 10:57 AM).  # Obesity: Last BMI: 43.92.  # CKD: Last BUN/Cr (GFR): 16/0.84 (77), 9/30/2024: 10:57 AM.    ASCV RISK:  The 10-year ASCVD risk score (Rod GARCIA, et al., 2019) is: 12.6%    Values used to calculate the score:      Age: 64 years      Sex: Female      Is Non- : Yes      Diabetic: Yes      Tobacco smoker: No      Systolic Blood Pressure: 113 mmHg      Is BP treated: No      HDL Cholesterol: 56.8 mg/dL      Total Cholesterol: 184 mg/dL    Assessment:  64 y.o. female, with history significant for HTN, HLD, venous insufficiency, prediabetes, GERD, depression, PIPPA, prior smoking, lung nodule, and BMI 44, who visits Cardiology today as a new patient for elevated calcium score (159 prox LAD, p90). No cardiac symptoms to suggest ischemia at her level of physical activity and no signs heart failure. Transthoracic echocardiogram was negative for  regional wall motion abnormalities and showed normal LVEF.  Assessment & Plan  Abnormal Heart Score CT  - Start low dose aspirin (patient used it in the past without side effects)  Hyperlipidemia, unspecified hyperlipidemia type  - Increase Lipitor to 80 mg every day for an LDL <70  - Recheck CMP/LDL in 3 months  Essential hypertension  - Appears well controlled on actual regimen    Patient will follow up with her PCP once the results are available.  I spent 48 minutes assessing the case between pre-charting, face-to-face patient interaction, and documentation.    Damián Nickerson MD

## 2024-10-15 ENCOUNTER — ANCILLARY PROCEDURE (OUTPATIENT)
Dept: CARDIOLOGY | Facility: CLINIC | Age: 64
End: 2024-10-15
Payer: MEDICARE

## 2024-10-15 PROCEDURE — 93005 ELECTROCARDIOGRAM TRACING: CPT

## 2024-10-16 LAB
ATRIAL RATE: 80 BPM
P AXIS: 47 DEGREES
P OFFSET: 183 MS
P ONSET: 125 MS
PR INTERVAL: 194 MS
Q ONSET: 222 MS
QRS COUNT: 13 BEATS
QRS DURATION: 84 MS
QT INTERVAL: 396 MS
QTC CALCULATION(BAZETT): 456 MS
QTC FREDERICIA: 436 MS
R AXIS: -2 DEGREES
T AXIS: 19 DEGREES
T OFFSET: 420 MS
VENTRICULAR RATE: 80 BPM

## 2024-10-18 DIAGNOSIS — E87.6 HYPOKALEMIA: ICD-10-CM

## 2024-10-22 RX ORDER — POTASSIUM CHLORIDE 750 MG/1
10 TABLET, EXTENDED RELEASE ORAL DAILY
Qty: 90 TABLET | Refills: 0 | Status: SHIPPED | OUTPATIENT
Start: 2024-10-22

## 2024-11-07 ENCOUNTER — APPOINTMENT (OUTPATIENT)
Dept: ORTHOPEDIC SURGERY | Facility: CLINIC | Age: 64
End: 2024-11-07
Payer: MEDICARE

## 2024-11-14 ENCOUNTER — APPOINTMENT (OUTPATIENT)
Dept: OPHTHALMOLOGY | Facility: CLINIC | Age: 64
End: 2024-11-14
Payer: MEDICARE

## 2024-11-14 DIAGNOSIS — H04.123 DRY EYES, BILATERAL: ICD-10-CM

## 2024-11-14 DIAGNOSIS — H40.003 GLAUCOMA SUSPECT OF BOTH EYES: Primary | ICD-10-CM

## 2024-11-14 DIAGNOSIS — H25.813 COMBINED FORM OF AGE-RELATED CATARACT, BOTH EYES: ICD-10-CM

## 2024-11-14 DIAGNOSIS — H52.203 HYPEROPIA OF BOTH EYES WITH ASTIGMATISM AND PRESBYOPIA: ICD-10-CM

## 2024-11-14 DIAGNOSIS — H52.4 HYPEROPIA OF BOTH EYES WITH ASTIGMATISM AND PRESBYOPIA: ICD-10-CM

## 2024-11-14 DIAGNOSIS — Z01.00 EYE EXAM, ROUTINE: ICD-10-CM

## 2024-11-14 DIAGNOSIS — H52.03 HYPEROPIA OF BOTH EYES WITH ASTIGMATISM AND PRESBYOPIA: ICD-10-CM

## 2024-11-14 PROCEDURE — 92004 COMPRE OPH EXAM NEW PT 1/>: CPT | Performed by: OPTOMETRIST

## 2024-11-14 PROCEDURE — 92133 CPTRZD OPH DX IMG PST SGM ON: CPT | Performed by: OPTOMETRIST

## 2024-11-14 PROCEDURE — 92015 DETERMINE REFRACTIVE STATE: CPT | Performed by: OPTOMETRIST

## 2024-11-14 PROCEDURE — 92083 EXTENDED VISUAL FIELD XM: CPT | Performed by: OPTOMETRIST

## 2024-11-14 ASSESSMENT — ENCOUNTER SYMPTOMS
RESPIRATORY NEGATIVE: 0
HEMATOLOGIC/LYMPHATIC NEGATIVE: 0
EYES NEGATIVE: 1
NEUROLOGICAL NEGATIVE: 0
ENDOCRINE NEGATIVE: 0
PSYCHIATRIC NEGATIVE: 0
MUSCULOSKELETAL NEGATIVE: 0
CARDIOVASCULAR NEGATIVE: 0
GASTROINTESTINAL NEGATIVE: 0
ALLERGIC/IMMUNOLOGIC NEGATIVE: 0
CONSTITUTIONAL NEGATIVE: 0

## 2024-11-14 ASSESSMENT — CONF VISUAL FIELD
OD_SUPERIOR_NASAL_RESTRICTION: 0
OS_SUPERIOR_NASAL_RESTRICTION: 0
OS_INFERIOR_TEMPORAL_RESTRICTION: 0
OD_INFERIOR_NASAL_RESTRICTION: 0
OS_NORMAL: 1
OS_INFERIOR_NASAL_RESTRICTION: 0
OD_NORMAL: 1
OS_SUPERIOR_TEMPORAL_RESTRICTION: 0
OD_SUPERIOR_TEMPORAL_RESTRICTION: 0
OD_INFERIOR_TEMPORAL_RESTRICTION: 0

## 2024-11-14 ASSESSMENT — REFRACTION_MANIFEST
OD_ADD: +2.50
OD_CYLINDER: -1.00
OS_ADD: +2.50
OS_SPHERE: PLANO
OS_AXIS: 005
OD_AXIS: 075
OS_CYLINDER: -0.25
OD_SPHERE: +0.25

## 2024-11-14 ASSESSMENT — SLIT LAMP EXAM - LIDS
COMMENTS: GOOD POSITION
COMMENTS: GOOD POSITION

## 2024-11-14 ASSESSMENT — TONOMETRY
OS_IOP_MMHG: 18
IOP_METHOD: GOLDMANN APPLANATION
OD_IOP_MMHG: 14

## 2024-11-14 ASSESSMENT — EXTERNAL EXAM - RIGHT EYE: OD_EXAM: NORMAL

## 2024-11-14 ASSESSMENT — CUP TO DISC RATIO
OD_RATIO: .5
OS_RATIO: .5

## 2024-11-14 ASSESSMENT — VISUAL ACUITY
OD_SC: 20/20
OS_SC: 20/20
METHOD: SNELLEN - LINEAR

## 2024-11-14 ASSESSMENT — PACHYMETRY
OD_CT(UM): 522
OS_CT(UM): 519

## 2024-11-14 ASSESSMENT — EXTERNAL EXAM - LEFT EYE: OS_EXAM: NORMAL

## 2024-11-14 NOTE — PROGRESS NOTES
DMII without retinopathy.    Mild cataracts     Glaucoma suspect due to optic nerve cupping OU.  IOP 14/18 pachy adjust +1 OD/OS    Optical coherence tomography of the retinal nerve fiber layer (RNFL) revealed:   OD: Normal thickness in all four sectors with a reduced average RNFL thickness of 71 micron.  OS: Reduced thickness in inferior sectors with a reduced average RNFL thickness of 71 micron.   First OCT.     A Calderon 24-2 threshold visual field test was done.  Results were:  OD: the absence of scotoma, pattern standard deviation 2.21 dB, mean deviation -3.46 dB  OS: the absence of scotoma, pattern standard deviation 1.23 dB, mean deviation -2.54 dB     Cataracts and stable VA corrects to 20/20 OD/OS. New Rx for glasses given per patient request. Patient's signature obtained to acknowledge and confirm that a paper copy of glasses Rx was given to patient in compliance with [facility] Eyeglass Rule. Electronic copy of Rx will also be available via [employer].      Rtc 1 year manifest refraction (MR) DFE IOP OCT RNFL

## 2024-11-18 ENCOUNTER — APPOINTMENT (OUTPATIENT)
Dept: PRIMARY CARE | Facility: CLINIC | Age: 64
End: 2024-11-18
Payer: MEDICARE

## 2024-11-21 ENCOUNTER — OFFICE VISIT (OUTPATIENT)
Dept: ORTHOPEDIC SURGERY | Facility: CLINIC | Age: 64
End: 2024-11-21
Payer: MEDICARE

## 2024-11-21 DIAGNOSIS — M17.0 PRIMARY OSTEOARTHRITIS OF BOTH KNEES: Primary | ICD-10-CM

## 2024-11-21 PROCEDURE — 1159F MED LIST DOCD IN RCRD: CPT | Performed by: ORTHOPAEDIC SURGERY

## 2024-11-21 PROCEDURE — 3049F LDL-C 100-129 MG/DL: CPT | Performed by: ORTHOPAEDIC SURGERY

## 2024-11-21 PROCEDURE — 3044F HG A1C LEVEL LT 7.0%: CPT | Performed by: ORTHOPAEDIC SURGERY

## 2024-11-21 PROCEDURE — 2500000004 HC RX 250 GENERAL PHARMACY W/ HCPCS (ALT 636 FOR OP/ED): Performed by: ORTHOPAEDIC SURGERY

## 2024-11-21 PROCEDURE — 1036F TOBACCO NON-USER: CPT | Performed by: ORTHOPAEDIC SURGERY

## 2024-11-21 PROCEDURE — 20610 DRAIN/INJ JOINT/BURSA W/O US: CPT | Mod: 50 | Performed by: ORTHOPAEDIC SURGERY

## 2024-11-21 RX ORDER — TRIAMCINOLONE ACETONIDE 40 MG/ML
1 INJECTION, SUSPENSION INTRA-ARTICULAR; INTRAMUSCULAR
Status: COMPLETED | OUTPATIENT
Start: 2024-11-21 | End: 2024-11-21

## 2024-11-21 RX ORDER — LIDOCAINE HYDROCHLORIDE 10 MG/ML
4 INJECTION, SOLUTION INFILTRATION; PERINEURAL
Status: COMPLETED | OUTPATIENT
Start: 2024-11-21 | End: 2024-11-21

## 2024-11-21 NOTE — PROGRESS NOTES
L Inj/Asp: bilateral knee on 11/21/2024 10:46 AM  Indications: pain and joint swelling  Details: 22 G needle, anterolateral approach  Medications (Right): 1 mL triamcinolone acetonide 40 mg/mL; 4 mL lidocaine 10 mg/mL (1 %)  Medications (Left): 1 mL triamcinolone acetonide 40 mg/mL; 4 mL lidocaine 10 mg/mL (1 %)  Outcome: tolerated well, no immediate complications    Discussion:  I discussed the conservative treatment options for knee osteoarthritis including but not limited to physical therapy, oral NSAIDS, activity and lifestyle modification, and corticosteroid injections. Pt has elected to undergo a cortisone injection today. I have explained the risk and benefits of an injection including the possibility of joint infection, bleeding, damage to cartilage, allergic reaction. Patient verbalized understanding and gave verbal consent wishes to proceed with a intra-articular cortisone injection for their knee.    Procedure:  After discussing the risk and benefits of the procedure, we proceeded with an intra-articular bilateral knee injection.    With the patient's informed verbal consent, the bilateral knees were prepped in standard sterile fashion with Chlorhexidine. The skin was then anesthetized with ethyl chloride spray and cleaned again with Chlorhexidine. The bilateral knees were then apirated/injected with a prefilled 20-gauge syringe of 40 mg Kenalog + 4 ml Lidocaine using the lateral approach without complications.  The patient tolerated this well and felt immediate initial relief of symptoms. A bandaid was applied and the patient ambulated out of the clinic on ther own accord without difficulty. Patient was instructed to avoid physical activity for 24-48 hours to prevent the knees from swelling and may ice the knees as tolerated. Patient should contact the office if any signs of of infection appear: redness, fever, chills, drainage, swelling or warmth to the knees.  Pt understands that the injections can be  repeated no sooner than 3 months.      Procedure, treatment alternatives, risks and benefits explained, specific risks discussed. Consent was given by the patient. Immediately prior to procedure a time out was called to verify the correct patient, procedure, equipment, support staff and site/side marked as required. Patient was prepped and draped in the usual sterile fashion.

## 2024-12-02 DIAGNOSIS — R60.0 BILATERAL LEG EDEMA: ICD-10-CM

## 2024-12-02 DIAGNOSIS — I10 BENIGN ESSENTIAL HYPERTENSION: ICD-10-CM

## 2024-12-02 RX ORDER — TRIAMTERENE/HYDROCHLOROTHIAZID 37.5-25 MG
1 TABLET ORAL DAILY
Qty: 90 TABLET | Refills: 1 | Status: SHIPPED | OUTPATIENT
Start: 2024-12-02

## 2024-12-02 RX ORDER — FUROSEMIDE 40 MG/1
40 TABLET ORAL DAILY
Qty: 90 TABLET | Refills: 1 | OUTPATIENT
Start: 2024-12-02

## 2024-12-18 ENCOUNTER — OFFICE VISIT (OUTPATIENT)
Dept: PRIMARY CARE | Facility: CLINIC | Age: 64
End: 2024-12-18
Payer: MEDICARE

## 2024-12-18 VITALS
HEIGHT: 63 IN | BODY MASS INDEX: 42.88 KG/M2 | WEIGHT: 242 LBS | DIASTOLIC BLOOD PRESSURE: 73 MMHG | SYSTOLIC BLOOD PRESSURE: 128 MMHG | HEART RATE: 91 BPM | OXYGEN SATURATION: 92 %

## 2024-12-18 DIAGNOSIS — Z00.00 MEDICARE ANNUAL WELLNESS VISIT, INITIAL: Primary | ICD-10-CM

## 2024-12-18 DIAGNOSIS — E66.01 OBESITY, MORBID (MORE THAN 100 LBS OVER IDEAL WEIGHT OR BMI > 40) (MULTI): ICD-10-CM

## 2024-12-18 DIAGNOSIS — I10 PRIMARY HYPERTENSION: ICD-10-CM

## 2024-12-18 DIAGNOSIS — E78.2 MIXED HYPERLIPIDEMIA: ICD-10-CM

## 2024-12-18 PROCEDURE — 1159F MED LIST DOCD IN RCRD: CPT | Performed by: STUDENT IN AN ORGANIZED HEALTH CARE EDUCATION/TRAINING PROGRAM

## 2024-12-18 PROCEDURE — G0402 INITIAL PREVENTIVE EXAM: HCPCS | Performed by: STUDENT IN AN ORGANIZED HEALTH CARE EDUCATION/TRAINING PROGRAM

## 2024-12-18 PROCEDURE — 3049F LDL-C 100-129 MG/DL: CPT | Performed by: STUDENT IN AN ORGANIZED HEALTH CARE EDUCATION/TRAINING PROGRAM

## 2024-12-18 PROCEDURE — 3078F DIAST BP <80 MM HG: CPT | Performed by: STUDENT IN AN ORGANIZED HEALTH CARE EDUCATION/TRAINING PROGRAM

## 2024-12-18 PROCEDURE — 1124F ACP DISCUSS-NO DSCNMKR DOCD: CPT | Performed by: STUDENT IN AN ORGANIZED HEALTH CARE EDUCATION/TRAINING PROGRAM

## 2024-12-18 PROCEDURE — 3074F SYST BP LT 130 MM HG: CPT | Performed by: STUDENT IN AN ORGANIZED HEALTH CARE EDUCATION/TRAINING PROGRAM

## 2024-12-18 PROCEDURE — 1160F RVW MEDS BY RX/DR IN RCRD: CPT | Performed by: STUDENT IN AN ORGANIZED HEALTH CARE EDUCATION/TRAINING PROGRAM

## 2024-12-18 PROCEDURE — 1170F FXNL STATUS ASSESSED: CPT | Performed by: STUDENT IN AN ORGANIZED HEALTH CARE EDUCATION/TRAINING PROGRAM

## 2024-12-18 PROCEDURE — 3008F BODY MASS INDEX DOCD: CPT | Performed by: STUDENT IN AN ORGANIZED HEALTH CARE EDUCATION/TRAINING PROGRAM

## 2024-12-18 PROCEDURE — 1036F TOBACCO NON-USER: CPT | Performed by: STUDENT IN AN ORGANIZED HEALTH CARE EDUCATION/TRAINING PROGRAM

## 2024-12-18 PROCEDURE — 3044F HG A1C LEVEL LT 7.0%: CPT | Performed by: STUDENT IN AN ORGANIZED HEALTH CARE EDUCATION/TRAINING PROGRAM

## 2024-12-18 PROCEDURE — 99214 OFFICE O/P EST MOD 30 MIN: CPT | Performed by: STUDENT IN AN ORGANIZED HEALTH CARE EDUCATION/TRAINING PROGRAM

## 2024-12-18 ASSESSMENT — PATIENT HEALTH QUESTIONNAIRE - PHQ9
SUM OF ALL RESPONSES TO PHQ9 QUESTIONS 1 AND 2: 0
1. LITTLE INTEREST OR PLEASURE IN DOING THINGS: NOT AT ALL
2. FEELING DOWN, DEPRESSED OR HOPELESS: NOT AT ALL

## 2024-12-18 ASSESSMENT — ACTIVITIES OF DAILY LIVING (ADL): BATHING: INDEPENDENT

## 2024-12-18 NOTE — PROGRESS NOTES
"Subjective   Patient ID: Felecia Alejo is a 64 y.o. female who presents for Follow-up (3 month follow-up. ).        HPI    64-year-old female with hypertension, hyperlipidemia, prediabetes, obesity         Visit Vitals  /73   Pulse 91   Ht 1.6 m (5' 3\")   Wt 110 kg (242 lb)   SpO2 92%   BMI 42.87 kg/m²   OB Status Hysterectomy   Smoking Status Former   BSA 2.21 m²      No LMP recorded. Patient has had a hysterectomy.   Current Outpatient Medications   Medication Instructions    aspirin 81 mg, oral, Daily    atorvastatin (LIPITOR) 80 mg, oral, Daily    Blood Pressure Cuff misc     blood sugar diagnostic (OneTouch Ultra Test) strip TEST BLOOD GLUCOSE ONCE DAILY    diclofenac sodium (Voltaren) 1 % gel gel 1 Application, Topical, 4 times daily    furosemide (Lasix) 20 mg tablet Take 1 tablet by mouth every other day. On the days that you take this medication , take half a pill of the triamterene - hydrochlorothiazide    lancets misc Test blood glucose once daily    OneTouch Delica Plus Lancet 33 gauge misc TEST BLOOD GLUCOSE ONCE DAILY    potassium chloride CR 10 mEq ER tablet 10 mEq, oral, Daily, DO NOT CRUSH CHEW OR SPLIT    topiramate (TOPAMAX) 100 mg, oral, Daily    triamterene-hydrochlorothiazid (Maxzide-25) 37.5-25 mg tablet 1 tablet, oral, Daily      Social History     Tobacco Use    Smoking status: Former     Types: Cigarettes    Smokeless tobacco: Never   Substance Use Topics    Alcohol use: Not Currently     Comment: AA 35 years        Review of Systems    Constitutional : No feeling poorly / fevers/ chills / night sweats/ fatigue   Cardiovascular : No CP /Palpitations/ lower extremity edema / syncope   Respiratory : No Cough /ABAD/Dyspnea at rest   Gastrointestinal : No abd pain / N/V  No bloody stools/ melena / constipation  Endo : No polyuria/polydipsia/ muscle weakness / sluggishness   CNS: No confusion / HA/ tingling/ numbness/ weakness of extremities  Psychiatric: No anxiety/ depression/ " SI/HI    All other systems have been reviewed and are negative for complaint       Physical Exam    Constitutional : Vitals reviewed. Alert and in no distress  Cardiovascular : RRR, Normal S1, S2, No pericardial rub/ gallop, no peripheral edema   Pulmonary: No respiratory distress, CTAB   MSK : Normal gait and station , strength and tone   Skin: Warm to touch ,  normal skin turgor   Neurologic : CNs 2-12 grossly intact , no obvious FNDs  Psych : A,Ox3, normal mood and affect      Assessment/Plan   Diagnoses and all orders for this visit:  Medicare annual wellness visit, initial  Primary hypertension  Mixed hyperlipidemia  Obesity, morbid (more than 100 lbs over ideal weight or BMI > 40) (Multi)          64-year-old female with hypertension, hyperlipidemia, prediabetes, obesity     Continue current meds   Losing weight on topiramte       RTO in 6m     Conditions addressed and mgmt as noted above.  Pertinent labs, images/ imaging reports , chart review was done .   Age appropriate labs / labs for mgmt of chronic medical conditions ordered, further mgmt pending the results.       This note is intended for the physician writing it, as well as to communicate findings to other healthcare professionals. These notes use medical lexicon that may be misunderstood by non medical persons. Therefore, interpretations of medical notes and terminology should be approached with caution.

## 2024-12-18 NOTE — PROGRESS NOTES
Subjective   Reason for Visit: Felecia Alejo is an 64 y.o. female here for a Medicare Wellness visit.     Past Medical, Surgical, and Family History reviewed and updated in chart.    Reviewed all medications by prescribing practitioner or clinical pharmacist (such as prescriptions, OTCs, herbal therapies and supplements) and documented in the medical record.    HPI    64-year-old female with hypertension, hyperlipidemia, prediabetes, obesity           Patient Care Team:  Patti Fernandez MD as PCP - General (Family Medicine)     Current Outpatient Medications   Medication Instructions    aspirin 81 mg, oral, Daily    atorvastatin (LIPITOR) 80 mg, oral, Daily    Blood Pressure Cuff misc     blood sugar diagnostic (OneTouch Ultra Test) strip TEST BLOOD GLUCOSE ONCE DAILY    diclofenac sodium (Voltaren) 1 % gel gel 1 Application, Topical, 4 times daily    furosemide (Lasix) 20 mg tablet Take 1 tablet by mouth every other day. On the days that you take this medication , take half a pill of the triamterene - hydrochlorothiazide    lancets misc Test blood glucose once daily    OneTouch Delica Plus Lancet 33 gauge misc TEST BLOOD GLUCOSE ONCE DAILY    potassium chloride CR 10 mEq ER tablet 10 mEq, oral, Daily, DO NOT CRUSH CHEW OR SPLIT    topiramate (TOPAMAX) 100 mg, oral, Daily    triamterene-hydrochlorothiazid (Maxzide-25) 37.5-25 mg tablet 1 tablet, oral, Daily        Social History     Tobacco Use    Smoking status: Former     Types: Cigarettes    Smokeless tobacco: Never   Substance Use Topics    Alcohol use: Not Currently     Comment: AA 35 years        Review of Systems  Constitutional: no chills, no fever and no night sweats.     Eyes: no blurred vision and no eyesight problems.     ENT: no hearing loss, no nasal congestion, no nasal discharge, no hoarseness and no sore throat.     Cardiovascular: no chest pain, no intermittent leg claudication, no lower extremity edema, no palpitations and no syncope.  "    Respiratory: no cough, no shortness of breath during exertion, no shortness of breath at rest and no wheezing.     Gastrointestinal: no abdominal pain, no blood in stools, no constipation, no diarrhea, no melena, no nausea, no rectal pain and no vomiting.     Genitourinary: no dysuria, no change in urinary frequency, no urinary hesitancy, no feelings of urinary urgency and no vaginal discharge.     Musculoskeletal: no arthralgias, no back pain and no myalgias.     Integumentary: no new skin lesions and no rashes.     Neurological: no difficulty walking, no headache, no limb weakness, no numbness and no tingling.     Psychiatric: no anxiety, no depression, no anhedonia and no substance use disorders.     Endocrine: no recent weight gain and no recent weight loss.     Hematologic/Lymphatic: no tendency for easy bruising and no swollen glands.          All other systems have been reviewed and are negative for complaint.    Objective   Vitals:  /73   Pulse 91   Ht 1.6 m (5' 3\")   Wt 110 kg (242 lb)   SpO2 92%   BMI 42.87 kg/m²       Physical Exam    Constitutional: Alert and in no acute distress. Well developed, well nourished.     Eyes: Normal external exam. Pupils were equal in size, round, reactive to light (PERRL) with normal accommodation and extraocular movements intact (EOMI).     Ears, Nose, Mouth, and Throat: External inspection of ears and nose: Normal.  Otoscopic examination: Normal.      Neck: No neck mass was observed. Supple.     Cardiovascular: Heart rate and rhythm were normal, normal S1 and S2, no gallops, no murmurs and no pericardial rub    Pulmonary: No respiratory distress. Clear bilateral breath sounds.     Abdomen: Soft nontender; no abdominal mass palpated. No organomegaly.     Musculoskeletal: No joint swelling seen, normal movements of all extremities. Range of motion: Normal.  Muscle strength/tone: Normal.          Neurologic: Deep tendon reflexes were 2+ and symmetric. " Sensation: Normal.     Psychiatric: Judgment and insight: Intact. Mood and affect: Normal.      Assessment/Plan   Problem List Items Addressed This Visit       Hyperlipidemia     Other Visit Diagnoses       Medicare annual wellness visit, initial    -  Primary    Primary hypertension        Obesity, morbid (more than 100 lbs over ideal weight or BMI > 40) (Multi)                 64-year-old female with hypertension, hyperlipidemia, prediabetes, obesity       Vaccines :   Flu : declined   PNA : deferred  Shingles :  advised to receive at the pharmacy      Mamm : current   Colonoscopy done in 2022   Dexa : normal density in 2023     This note is intended for the physician writing it, as well as to communicate findings to other healthcare professionals. These notes use medical lexicon that may be misunderstood by non medical persons. Therefore, interpretations of medical notes and terminology should be approached with caution.

## 2024-12-19 ENCOUNTER — APPOINTMENT (OUTPATIENT)
Dept: PRIMARY CARE | Facility: CLINIC | Age: 64
End: 2024-12-19
Payer: MEDICARE

## 2025-01-08 DIAGNOSIS — F33.9 DEPRESSION, RECURRENT (CMS-HCC): ICD-10-CM

## 2025-01-17 RX ORDER — ESCITALOPRAM OXALATE 5 MG/1
5 TABLET ORAL DAILY
Qty: 90 TABLET | Refills: 1 | Status: SHIPPED | OUTPATIENT
Start: 2025-01-17

## 2025-02-08 DIAGNOSIS — E87.6 HYPOKALEMIA: ICD-10-CM

## 2025-02-08 DIAGNOSIS — R60.0 BILATERAL LEG EDEMA: ICD-10-CM

## 2025-02-08 DIAGNOSIS — E66.01 CLASS 3 SEVERE OBESITY DUE TO EXCESS CALORIES WITHOUT SERIOUS COMORBIDITY WITH BODY MASS INDEX (BMI) OF 45.0 TO 49.9 IN ADULT: ICD-10-CM

## 2025-02-08 DIAGNOSIS — E66.813 CLASS 3 SEVERE OBESITY DUE TO EXCESS CALORIES WITHOUT SERIOUS COMORBIDITY WITH BODY MASS INDEX (BMI) OF 45.0 TO 49.9 IN ADULT: ICD-10-CM

## 2025-02-10 RX ORDER — POTASSIUM CHLORIDE 750 MG/1
10 TABLET, EXTENDED RELEASE ORAL DAILY
Qty: 90 TABLET | Refills: 3 | Status: SHIPPED | OUTPATIENT
Start: 2025-02-10

## 2025-02-12 DIAGNOSIS — R60.9 DEPENDENT EDEMA: ICD-10-CM

## 2025-02-12 RX ORDER — FUROSEMIDE 20 MG/1
TABLET ORAL
Qty: 45 TABLET | Refills: 1 | Status: SHIPPED | OUTPATIENT
Start: 2025-02-12

## 2025-02-19 RX ORDER — TOPIRAMATE 100 MG/1
100 TABLET, FILM COATED ORAL DAILY
Qty: 90 TABLET | Refills: 1 | Status: SHIPPED | OUTPATIENT
Start: 2025-02-19

## 2025-02-19 RX ORDER — FUROSEMIDE 40 MG/1
40 TABLET ORAL DAILY
Qty: 90 TABLET | Refills: 1 | OUTPATIENT
Start: 2025-02-19

## 2025-02-20 ENCOUNTER — OFFICE VISIT (OUTPATIENT)
Dept: ORTHOPEDIC SURGERY | Facility: CLINIC | Age: 65
End: 2025-02-20
Payer: MEDICARE

## 2025-02-20 DIAGNOSIS — M17.0 PRIMARY OSTEOARTHRITIS OF BOTH KNEES: Primary | ICD-10-CM

## 2025-02-20 PROCEDURE — 1125F AMNT PAIN NOTED PAIN PRSNT: CPT | Performed by: ORTHOPAEDIC SURGERY

## 2025-02-20 PROCEDURE — 20610 DRAIN/INJ JOINT/BURSA W/O US: CPT | Mod: 50 | Performed by: ORTHOPAEDIC SURGERY

## 2025-02-20 PROCEDURE — 2500000004 HC RX 250 GENERAL PHARMACY W/ HCPCS (ALT 636 FOR OP/ED): Performed by: ORTHOPAEDIC SURGERY

## 2025-02-20 RX ORDER — LIDOCAINE HYDROCHLORIDE 10 MG/ML
4 INJECTION, SOLUTION INFILTRATION; PERINEURAL
Status: COMPLETED | OUTPATIENT
Start: 2025-02-20 | End: 2025-02-20

## 2025-02-20 RX ORDER — TRIAMCINOLONE ACETONIDE 40 MG/ML
1 INJECTION, SUSPENSION INTRA-ARTICULAR; INTRAMUSCULAR
Status: COMPLETED | OUTPATIENT
Start: 2025-02-20 | End: 2025-02-20

## 2025-02-20 RX ADMIN — LIDOCAINE HYDROCHLORIDE 4 ML: 10 INJECTION, SOLUTION INFILTRATION; PERINEURAL at 12:51

## 2025-02-20 RX ADMIN — TRIAMCINOLONE ACETONIDE 1 ML: 40 INJECTION, SUSPENSION INTRA-ARTICULAR; INTRAMUSCULAR at 12:51

## 2025-02-20 ASSESSMENT — PAIN SCALES - GENERAL: PAINLEVEL_OUTOF10: 6

## 2025-02-20 ASSESSMENT — PAIN - FUNCTIONAL ASSESSMENT: PAIN_FUNCTIONAL_ASSESSMENT: 0-10

## 2025-02-20 ASSESSMENT — PAIN DESCRIPTION - DESCRIPTORS: DESCRIPTORS: DISCOMFORT;ACHING

## 2025-02-20 NOTE — PROGRESS NOTES
L Inj/Asp: bilateral knee on 2/20/2025 12:51 PM  Indications: pain and joint swelling  Details: 22 G needle, anterolateral approach  Medications (Right): 1 mL triamcinolone acetonide 40 mg/mL; 4 mL lidocaine 10 mg/mL (1 %)  Medications (Left): 1 mL triamcinolone acetonide 40 mg/mL; 4 mL lidocaine 10 mg/mL (1 %)  Outcome: tolerated well, no immediate complications    Discussion:  I discussed the conservative treatment options for knee osteoarthritis including but not limited to physical therapy, oral NSAIDS, activity and lifestyle modification, and corticosteroid injections. Pt has elected to undergo a cortisone injection today. I have explained the risk and benefits of an injection including the possibility of joint infection, bleeding, damage to cartilage, allergic reaction. Patient verbalized understanding and gave verbal consent wishes to proceed with a intra-articular cortisone injection for their knee.    Procedure:  After discussing the risk and benefits of the procedure, we proceeded with an intra-articular bilateral knee injection.    With the patient's informed verbal consent, the bilateral knees were prepped in standard sterile fashion with Chlorhexidine. The skin was then anesthetized with ethyl chloride spray and cleaned again with Chlorhexidine. The bilateral knees were then apirated/injected with a prefilled 20-gauge syringe of 40 mg Kenalog + 4 ml Lidocaine using the lateral approach without complications.  The patient tolerated this well and felt immediate initial relief of symptoms. A bandaid was applied and the patient ambulated out of the clinic on ther own accord without difficulty. Patient was instructed to avoid physical activity for 24-48 hours to prevent the knees from swelling and may ice the knees as tolerated. Patient should contact the office if any signs of of infection appear: redness, fever, chills, drainage, swelling or warmth to the knees.  Pt understands that the injections can be  repeated no sooner than 3 months.      Procedure, treatment alternatives, risks and benefits explained, specific risks discussed. Consent was given by the patient. Immediately prior to procedure a time out was called to verify the correct patient, procedure, equipment, support staff and site/side marked as required. Patient was prepped and draped in the usual sterile fashion.

## 2025-02-28 ENCOUNTER — APPOINTMENT (OUTPATIENT)
Dept: PRIMARY CARE | Facility: CLINIC | Age: 65
End: 2025-02-28
Payer: MEDICARE

## 2025-05-22 ENCOUNTER — OFFICE VISIT (OUTPATIENT)
Dept: ORTHOPEDIC SURGERY | Facility: CLINIC | Age: 65
End: 2025-05-22
Payer: MEDICARE

## 2025-05-22 VITALS — HEIGHT: 63 IN | BODY MASS INDEX: 42.88 KG/M2 | WEIGHT: 242 LBS

## 2025-05-22 DIAGNOSIS — M17.0 PRIMARY OSTEOARTHRITIS OF BOTH KNEES: Primary | ICD-10-CM

## 2025-05-22 PROCEDURE — 3008F BODY MASS INDEX DOCD: CPT | Performed by: ORTHOPAEDIC SURGERY

## 2025-05-22 PROCEDURE — 1159F MED LIST DOCD IN RCRD: CPT | Performed by: ORTHOPAEDIC SURGERY

## 2025-05-22 PROCEDURE — 20610 DRAIN/INJ JOINT/BURSA W/O US: CPT | Mod: 50 | Performed by: ORTHOPAEDIC SURGERY

## 2025-05-22 PROCEDURE — 1125F AMNT PAIN NOTED PAIN PRSNT: CPT | Performed by: ORTHOPAEDIC SURGERY

## 2025-05-22 PROCEDURE — 2500000004 HC RX 250 GENERAL PHARMACY W/ HCPCS (ALT 636 FOR OP/ED): Performed by: ORTHOPAEDIC SURGERY

## 2025-05-22 PROCEDURE — 1036F TOBACCO NON-USER: CPT | Performed by: ORTHOPAEDIC SURGERY

## 2025-05-22 RX ORDER — LIDOCAINE HYDROCHLORIDE 10 MG/ML
4 INJECTION, SOLUTION INFILTRATION; PERINEURAL
Status: COMPLETED | OUTPATIENT
Start: 2025-05-22 | End: 2025-05-22

## 2025-05-22 RX ORDER — TRIAMCINOLONE ACETONIDE 40 MG/ML
1 INJECTION, SUSPENSION INTRA-ARTICULAR; INTRAMUSCULAR
Status: COMPLETED | OUTPATIENT
Start: 2025-05-22 | End: 2025-05-22

## 2025-05-22 RX ADMIN — LIDOCAINE HYDROCHLORIDE 4 ML: 10 INJECTION, SOLUTION INFILTRATION; PERINEURAL at 10:54

## 2025-05-22 RX ADMIN — TRIAMCINOLONE ACETONIDE 1 ML: 400 INJECTION, SUSPENSION INTRA-ARTICULAR; INTRAMUSCULAR at 10:54

## 2025-05-22 ASSESSMENT — PAIN SCALES - GENERAL: PAINLEVEL_OUTOF10: 8

## 2025-05-22 ASSESSMENT — PAIN - FUNCTIONAL ASSESSMENT: PAIN_FUNCTIONAL_ASSESSMENT: 0-10

## 2025-05-22 NOTE — PROGRESS NOTES
L Inj/Asp: bilateral knee on 5/22/2025 10:54 AM  Indications: pain and joint swelling  Details: 22 G needle, anterolateral approach  Medications (Right): 1 mL triamcinolone acetonide 40 mg/mL; 4 mL lidocaine 10 mg/mL (1 %)  Medications (Left): 1 mL triamcinolone acetonide 40 mg/mL; 4 mL lidocaine 10 mg/mL (1 %)  Outcome: tolerated well, no immediate complications    Discussion:  I discussed the conservative treatment options for knee osteoarthritis including but not limited to physical therapy, oral NSAIDS, activity and lifestyle modification, and corticosteroid injections. Pt has elected to undergo a cortisone injection today. I have explained the risk and benefits of an injection including the possibility of joint infection, bleeding, damage to cartilage, allergic reaction. Patient verbalized understanding and gave verbal consent wishes to proceed with a intra-articular cortisone injection for their knee.    Procedure:  After discussing the risk and benefits of the procedure, we proceeded with an intra-articular bilateral knee injection.    With the patient's informed verbal consent, the bilateral knees were prepped in standard sterile fashion with Chlorhexidine. The skin was then anesthetized with ethyl chloride spray and cleaned again with Chlorhexidine. The bilateral knees were then apirated/injected with a prefilled 20-gauge syringe of 40 mg Kenalog + 4 ml Lidocaine using the lateral approach without complications.  The patient tolerated this well and felt immediate initial relief of symptoms. A bandaid was applied and the patient ambulated out of the clinic on ther own accord without difficulty. Patient was instructed to avoid physical activity for 24-48 hours to prevent the knees from swelling and may ice the knees as tolerated. Patient should contact the office if any signs of of infection appear: redness, fever, chills, drainage, swelling or warmth to the knees.  Pt understands that the injections can be  repeated no sooner than 3 months.      Procedure, treatment alternatives, risks and benefits explained, specific risks discussed. Consent was given by the patient. Immediately prior to procedure a time out was called to verify the correct patient, procedure, equipment, support staff and site/side marked as required. Patient was prepped and draped in the usual sterile fashion.

## 2025-06-19 ENCOUNTER — APPOINTMENT (OUTPATIENT)
Dept: PRIMARY CARE | Facility: CLINIC | Age: 65
End: 2025-06-19
Payer: MEDICARE

## 2025-06-27 ENCOUNTER — HOSPITAL ENCOUNTER (OUTPATIENT)
Dept: RADIOLOGY | Facility: CLINIC | Age: 65
Discharge: HOME | End: 2025-06-27
Payer: MEDICARE

## 2025-06-27 ENCOUNTER — OFFICE VISIT (OUTPATIENT)
Dept: ORTHOPEDIC SURGERY | Facility: CLINIC | Age: 65
End: 2025-06-27
Payer: MEDICARE

## 2025-06-27 DIAGNOSIS — R73.03 PREDIABETES: ICD-10-CM

## 2025-06-27 DIAGNOSIS — R60.0 BILATERAL LEG EDEMA: ICD-10-CM

## 2025-06-27 DIAGNOSIS — M79.672 LEFT FOOT PAIN: ICD-10-CM

## 2025-06-27 DIAGNOSIS — M76.822 POSTERIOR TIBIAL TENDON DYSFUNCTION, LEFT: ICD-10-CM

## 2025-06-27 DIAGNOSIS — I87.8 CHRONIC VENOUS STASIS: Primary | ICD-10-CM

## 2025-06-27 DIAGNOSIS — M76.821 POSTERIOR TIBIAL TENDON DYSFUNCTION, RIGHT: ICD-10-CM

## 2025-06-27 PROCEDURE — 73630 X-RAY EXAM OF FOOT: CPT | Mod: LT

## 2025-06-27 PROCEDURE — 73630 X-RAY EXAM OF FOOT: CPT | Mod: LEFT SIDE | Performed by: RADIOLOGY

## 2025-06-27 PROCEDURE — 99214 OFFICE O/P EST MOD 30 MIN: CPT | Performed by: ORTHOPAEDIC SURGERY

## 2025-06-27 NOTE — PROGRESS NOTES
HISTORY OF PRESENT ILLNESS  This is a pleasant 65 y.o. year old female  who presents on 06/27/2025 at the request of Patti Fernandez MD for evaluation of the left foot pain that has been present over/since 1 year.     How the condition occurred or started: Atraumatic, progressive, swelling in both legs and feet slightly worse on left wrt symptoms  Location of pain (patient points to): Anterior and lateral foot  Quality of pain: Mild to moderate discomfort  Modifying Factors: N/A  Associated Signs and symptoms: Swelling  Previous Treatment: diuretic use    PHYSICAL EXAMINATION  Constitutional Exam: patient's height and weight reviewed, well-kempt  Psychiatric Exam: alert and oriented x 3, appropriate mood and behavior  Eye Exam: PACO, EOMI  Pulmonary Exam: breathing non-labored, no apparent distress  Lymphatic exam: no appreciable lymphadenopathy in the lower extremities  Cardiovascular exam: DP pulses 2+ bilaterally, PT 2+ bilaterally, toes are pink with good capillary refill, 2+ pitting edema present  Skin exam: no open lesions, rashes, abrasions or ulcerations  Neurological exam: sensation to light touch intact in both lower extremities in peripheral and dermatomal distributions (except for any abnormalities noted in musculoskeletal exam)    Musculoskeletal exam: Bilateral lower extremities with symmetric 2+ pitting edema up to the knee.  No pain elicited with palpation about the bony/ligamentous/tendinous structures of the foot and ankle bilaterally.  No instability with anterior drawer or talar tilt.  Bilateral genu valgum present and too many toes sign present related to early posterior tibial tendon dysfunction bilaterally due to compensating for increased mal-aligned knee valgus position    DATA/RESULTS REVIEW: I personally reviewed the patient's x-ray images and reports of the left foot.  No evidence of fractures dislocations.  Overall maintained arch, midfoot with degenerative changes including  joint space narrowing.    ASSESSMENT: 65-year-old female with prediabetes presenting with 1 year history of atraumatic left foot and ankle swelling, mild discomfort with ambulation.  Points mainly to anterior lateral foot and anterior ankle area.  Unable to elicit pain on exam, but she notably does have significant bilateral 2+ lower extremity pitting edema.  She does have some evidence of flatfoot bilaterally which is likely worsened by her bilateral genu valgum deformities.    PLAN: I discussed with the patient the differential diagnosis, complex medical nature of the condition(s) and available treatment option(s).  We discussed that it is unlikely that her foot is causing her pain from a structural standpoint but she does have significant pitting edema likely from chronic venous stasis (or could be related to heart or kidney medical issues) on exam that is making shoewear quite difficult for her to be comfortable in.  She is on a diuretic but did not tolerate nylon compressive socks.  We discussed referral to vascular medicine, along with orthotics to help support her arch especially given her bilateral genu valgum.  We also recommended trial of cotton based compression socks that may be better tolerable than the nylon type, look for 8-15mmHg compression.  Leg elevation several times a day above heart level also helps with pitting edema.  Recommend lace-up type athletic shoes that can accommodate the foot and lower leg swelling that extends up to the knee.  The patient was given a prescription for custom-made orthotics, which are medically necessary due to the patient's medical condition and symptomatic posterior tibial tendon dysfunction and required for medial-lateral stability.  The patient is ambulatory.  Duration will be greater than 6 months.  Recommended that she follow-up with her PCP regarding any further evaluation needed for bilateral pitting edema for both feet up to knee level.  FUV prn.  The  "patient's questions were answered in detail.      Note dictated with Chumby software, completed without full type editing to avoid delay.      Dear Referring Physician,  It was my pleasure to see your patient, in the office today.  Please refer to the detailed notes above for my findings and recommendations.  Thank you once again for your consultation request.  If you have any further questions or concerns, please feel free to contact me via email or at the phone number and address below.  Sincerely,    Twila Tellez MD   of Orthopedic Surgery, [facility] [employer] [employer]  Dual Fellowship-trained in Orthopedic Sports Medicine (Knee and Shoulder), and Foot and Ankle Surgery  [facility] [place]   [employer] Subspecialty Certificate in Orthopedic Sports Medicine  Head Team Physician Case \"[place]\" and [facility] \"[facility]\"  Team [employer] Physician [id number] Par[employer]ympic Games  Team [place] Figure Skating Medical Practitioner, including International Event Coverage  Emeritus Director, Foot and Ankle Division, Department of Orthopedics    [address], [place] [address]  Office [phone]   "

## 2025-07-17 ENCOUNTER — APPOINTMENT (OUTPATIENT)
Dept: PRIMARY CARE | Facility: CLINIC | Age: 65
End: 2025-07-17
Payer: MEDICARE

## 2025-07-17 VITALS
OXYGEN SATURATION: 94 % | HEART RATE: 92 BPM | WEIGHT: 241.6 LBS | BODY MASS INDEX: 42.81 KG/M2 | SYSTOLIC BLOOD PRESSURE: 134 MMHG | DIASTOLIC BLOOD PRESSURE: 80 MMHG | HEIGHT: 63 IN

## 2025-07-17 DIAGNOSIS — E66.01 OBESITY, MORBID (MORE THAN 100 LBS OVER IDEAL WEIGHT OR BMI > 40) (MULTI): ICD-10-CM

## 2025-07-17 DIAGNOSIS — R73.02 IGT (IMPAIRED GLUCOSE TOLERANCE): ICD-10-CM

## 2025-07-17 DIAGNOSIS — I10 BENIGN ESSENTIAL HYPERTENSION: Primary | ICD-10-CM

## 2025-07-17 DIAGNOSIS — E66.813 CLASS 3 SEVERE OBESITY DUE TO EXCESS CALORIES WITHOUT SERIOUS COMORBIDITY WITH BODY MASS INDEX (BMI) OF 45.0 TO 49.9 IN ADULT: ICD-10-CM

## 2025-07-17 DIAGNOSIS — E78.2 MIXED HYPERLIPIDEMIA: ICD-10-CM

## 2025-07-17 PROBLEM — E11.9 DIABETES MELLITUS, TYPE 2 (MULTI): Status: RESOLVED | Noted: 2023-03-09 | Resolved: 2025-07-17

## 2025-07-17 PROCEDURE — 1160F RVW MEDS BY RX/DR IN RCRD: CPT | Performed by: STUDENT IN AN ORGANIZED HEALTH CARE EDUCATION/TRAINING PROGRAM

## 2025-07-17 PROCEDURE — 1159F MED LIST DOCD IN RCRD: CPT | Performed by: STUDENT IN AN ORGANIZED HEALTH CARE EDUCATION/TRAINING PROGRAM

## 2025-07-17 PROCEDURE — 3079F DIAST BP 80-89 MM HG: CPT | Performed by: STUDENT IN AN ORGANIZED HEALTH CARE EDUCATION/TRAINING PROGRAM

## 2025-07-17 PROCEDURE — 99214 OFFICE O/P EST MOD 30 MIN: CPT | Performed by: STUDENT IN AN ORGANIZED HEALTH CARE EDUCATION/TRAINING PROGRAM

## 2025-07-17 PROCEDURE — G2211 COMPLEX E/M VISIT ADD ON: HCPCS | Performed by: STUDENT IN AN ORGANIZED HEALTH CARE EDUCATION/TRAINING PROGRAM

## 2025-07-17 PROCEDURE — 3008F BODY MASS INDEX DOCD: CPT | Performed by: STUDENT IN AN ORGANIZED HEALTH CARE EDUCATION/TRAINING PROGRAM

## 2025-07-17 PROCEDURE — 3075F SYST BP GE 130 - 139MM HG: CPT | Performed by: STUDENT IN AN ORGANIZED HEALTH CARE EDUCATION/TRAINING PROGRAM

## 2025-07-17 RX ORDER — TOPIRAMATE 100 MG/1
100 TABLET, FILM COATED ORAL 2 TIMES DAILY
Qty: 180 TABLET | Refills: 1 | Status: SHIPPED | OUTPATIENT
Start: 2025-07-17

## 2025-07-17 RX ORDER — TRIAMTERENE AND HYDROCHLOROTHIAZIDE 37.5; 25 MG/1; MG/1
1 TABLET ORAL DAILY
Qty: 90 TABLET | Refills: 1 | Status: SHIPPED | OUTPATIENT
Start: 2025-07-17

## 2025-07-17 NOTE — PATIENT INSTRUCTIONS
triamterene-hydrochlorothiazid (Maxzide-25) 37.5-25 mg tablet - take one everyday except on days you take Lasix ( furosemide ) . On days that you take Lasix/ furosemide, take half a pill of triamterene-hydrochlorothiazid (Maxzide-25) 37.5-25 mg tablet.     Increase dose  of toiramae to 100mg twice daily to help with weight loss.     Talk  to orthopedic surgeon if you would benefit from knee replacement.     We are on a new system that is paperless.     Lab location for blood work :  1. Located across the office , just past the elevators .   [address]. [address].  If you are coming on a Saturday, go to [address] for the blood draw    Radiology : [address] for Xrays  You can also schedule non urgent imaging by calling .     For scheduling appts, call . You might be receiving call from central scheduling as well.    For scheduling colonoscopy, call .     For scheduling physical therapy, call [phone] ( [phone]).    For any other scheduling questions or locations, please ask the medical assistant or the .

## 2025-07-17 NOTE — PROGRESS NOTES
"Subjective   Patient ID: Felecia Alejo is a 65 y.o. female who presents for Follow-up (7 month follow-up. ).        HPI    65year-old female with hypertension, hyperlipidemia, prediabetes, obesity       Fu care of cmd     Visit Vitals  /80   Pulse 92   Ht 1.6 m (5' 3\")   Wt 110 kg (241 lb 9.6 oz)   SpO2 94%   BMI 42.80 kg/m²   OB Status Hysterectomy   Smoking Status Former   BSA 2.21 m²      No LMP recorded. Patient has had a hysterectomy.   Current Outpatient Medications   Medication Instructions    aspirin 81 mg, oral, Daily    atorvastatin (LIPITOR) 80 mg, oral, Daily    Blood Pressure Cuff misc     blood sugar diagnostic (OneTouch Ultra Test) strip TEST BLOOD GLUCOSE ONCE DAILY    diclofenac sodium (Voltaren) 1 % gel gel 1 Application, Topical, 4 times daily    furosemide (Lasix) 20 mg tablet TAKE 1 TABLET BY MOUTH EVERY OTHER DAY. ON THE DAYS THAT YOU TAKE THIS MEDICATION , TAKE HALF A PILL OF THE TRIAMTERENE - HYDROCHLOROTHIAZIDE    lancets misc Test blood glucose once daily    OneTouch Delica Plus Lancet 33 gauge misc TEST BLOOD GLUCOSE ONCE DAILY    potassium chloride CR 10 mEq ER tablet 10 mEq, oral, Daily, DO NOT CRUSH CHEW OR SPLIT    topiramate (TOPAMAX) 100 mg, oral, 2 times daily    triamterene-hydrochlorothiazid (Maxzide-25) 37.5-25 mg tablet 1 tablet, oral, Daily      Social History     Tobacco Use    Smoking status: Former     Types: Cigarettes    Smokeless tobacco: Never   Substance Use Topics    Alcohol use: Not Currently     Comment: AA 35 years        Review of Systems    Constitutional : No feeling poorly / fevers/ chills / night sweats/ fatigue   Cardiovascular : No CP /Palpitations/ lower extremity edema / syncope   Respiratory : No Cough /ABAD/Dyspnea at rest   Gastrointestinal : No abd pain / N/V  No bloody stools/ melena / constipation  Endo : No polyuria/polydipsia/ muscle weakness / sluggishness   CNS: No confusion / HA/ tingling/ numbness/ weakness of extremities  Psychiatric: No " "anxiety/ depression/ SI/HI    All other systems have been reviewed and are negative for complaint       Physical Exam    Constitutional : Vitals reviewed. Alert and in no distress  Cardiovascular : RRR, Normal S1, S2, no peripheral edema   Pulmonary: No respiratory distress, CTAB   MSK : Normal gait and station , strength and tone   Skin: Warm to touch ,  normal skin turgor   Neurologic : CNs 2-12 grossly intact , no obvious FNDs  Psych : A,Ox3, normal mood and affect      Assessment/Plan   Diagnoses and all orders for this visit:  Benign essential hypertension  -     triamterene-hydrochlorothiazid (Maxzide-25) 37.5-25 mg tablet; Take 1 tablet by mouth once daily.  -     Basic Metabolic Panel; Future  Obesity, morbid (more than 100 lbs over ideal weight or BMI > 40) (Multi)  Class 3 severe obesity due to excess calories without serious comorbidity with body mass index (BMI) of 45.0 to 49.9 in adult  -     topiramate (Topamax) 100 mg tablet; Take 1 tablet (100 mg) by mouth 2 times a day.  IGT (impaired glucose tolerance)  -     Hemoglobin A1C; Future  Mixed hyperlipidemia  -     Lipid Panel; Future    65year-old female with hypertension, hyperlipidemia, prediabetes, obesity        HTN : controlled   On maxzide and lasix   Lasix every other day and 1/2 of maxzide 37.5-25 on days she takes Lasix   Remaining days , she is to take full dose of maxzide       Obesity \" increase topiramate to 100mg BID , currently 100mg every day     Progressive nature of DJD , knees discussed   Xray 2020 reviewed     BMP, A1c , Lipid , 9/30 reviewed     Labs : as above           Conditions addressed and mgmt as noted above.  Pertinent labs, images/ imaging reports , chart review was done .   Age appropriate labs / labs for mgmt of chronic medical conditions ordered, further mgmt pending the results.         RTO in  m or sooner if needed . Labs to be done few days prior to the next visit.    For the sake of billing , entire note needs to be " taken into consideration. Repetition of meds is avoided for the sake of redundancy. Med list above reflects reconciled meds. Management of abnormal results may not always result in an addendum to the note , an annotation at the end of result or communication via pt portal is to be considered .      This note is intended for the physician writing it, as well as to communicate findings to other healthcare professionals. These notes use medical lexicon that may be misunderstood by non medical persons. Therefore, interpretations of medical notes and terminology should be approached with caution.

## 2025-07-18 LAB
ANION GAP SERPL CALCULATED.4IONS-SCNC: 9 MMOL/L (CALC) (ref 7–17)
BUN SERPL-MCNC: 18 MG/DL (ref 7–25)
BUN/CREAT SERPL: ABNORMAL (CALC) (ref 6–22)
CALCIUM SERPL-MCNC: 9 MG/DL (ref 8.6–10.4)
CHLORIDE SERPL-SCNC: 111 MMOL/L (ref 98–110)
CHOLEST SERPL-MCNC: 166 MG/DL
CHOLEST/HDLC SERPL: 3.3 (CALC)
CO2 SERPL-SCNC: 23 MMOL/L (ref 20–32)
CREAT SERPL-MCNC: 0.89 MG/DL (ref 0.5–1.05)
EGFRCR SERPLBLD CKD-EPI 2021: 71 ML/MIN/1.73M2
EST. AVERAGE GLUCOSE BLD GHB EST-MCNC: 143 MG/DL
EST. AVERAGE GLUCOSE BLD GHB EST-SCNC: 7.9 MMOL/L
GLUCOSE SERPL-MCNC: 95 MG/DL (ref 65–99)
HBA1C MFR BLD: 6.6 %
HDLC SERPL-MCNC: 51 MG/DL
LDLC SERPL CALC-MCNC: 94 MG/DL (CALC)
NONHDLC SERPL-MCNC: 115 MG/DL (CALC)
POTASSIUM SERPL-SCNC: 3.6 MMOL/L (ref 3.5–5.3)
SODIUM SERPL-SCNC: 143 MMOL/L (ref 135–146)
TRIGL SERPL-MCNC: 109 MG/DL

## 2025-08-17 DIAGNOSIS — E66.813 CLASS 3 SEVERE OBESITY DUE TO EXCESS CALORIES WITHOUT SERIOUS COMORBIDITY WITH BODY MASS INDEX (BMI) OF 45.0 TO 49.9 IN ADULT: ICD-10-CM

## 2025-08-17 DIAGNOSIS — R93.1 ABNORMAL HEART SCORE CT: ICD-10-CM

## 2025-08-18 RX ORDER — TOPIRAMATE 100 MG/1
100 TABLET, FILM COATED ORAL 2 TIMES DAILY
Qty: 180 TABLET | Refills: 0 | Status: SHIPPED | OUTPATIENT
Start: 2025-08-18

## 2025-08-18 RX ORDER — NAPROXEN SODIUM 220 MG/1
81 TABLET, FILM COATED ORAL DAILY
Qty: 90 TABLET | Refills: 3 | Status: SHIPPED | OUTPATIENT
Start: 2025-08-18

## 2025-09-03 ENCOUNTER — APPOINTMENT (OUTPATIENT)
Dept: ORTHOPEDIC SURGERY | Facility: CLINIC | Age: 65
End: 2025-09-03
Payer: MEDICARE

## 2025-09-04 ENCOUNTER — HOSPITAL ENCOUNTER (OUTPATIENT)
Dept: RADIOLOGY | Facility: HOSPITAL | Age: 65
Discharge: HOME | End: 2025-09-04
Payer: MEDICARE

## 2025-09-04 DIAGNOSIS — M17.0 PRIMARY OSTEOARTHRITIS OF BOTH KNEES: ICD-10-CM

## 2025-09-04 PROCEDURE — 73564 X-RAY EXAM KNEE 4 OR MORE: CPT | Mod: 50

## 2025-11-26 ENCOUNTER — APPOINTMENT (OUTPATIENT)
Dept: OPHTHALMOLOGY | Facility: CLINIC | Age: 65
End: 2025-11-26
Payer: COMMERCIAL

## 2025-12-18 ENCOUNTER — APPOINTMENT (OUTPATIENT)
Dept: PRIMARY CARE | Facility: CLINIC | Age: 65
End: 2025-12-18
Payer: MEDICARE